# Patient Record
Sex: FEMALE | Race: WHITE | NOT HISPANIC OR LATINO | Employment: OTHER | URBAN - METROPOLITAN AREA
[De-identification: names, ages, dates, MRNs, and addresses within clinical notes are randomized per-mention and may not be internally consistent; named-entity substitution may affect disease eponyms.]

---

## 2019-04-23 ENCOUNTER — HOSPITAL ENCOUNTER (INPATIENT)
Facility: HOSPITAL | Age: 79
LOS: 1 days | Discharge: HOME | DRG: 305 | End: 2019-04-25
Attending: EMERGENCY MEDICINE | Admitting: EMERGENCY MEDICINE
Payer: MEDICARE

## 2019-04-23 DIAGNOSIS — R11.10 VOMITING, INTRACTABILITY OF VOMITING NOT SPECIFIED, PRESENCE OF NAUSEA NOT SPECIFIED, UNSPECIFIED VOMITING TYPE: ICD-10-CM

## 2019-04-23 DIAGNOSIS — K52.9 ENTERITIS: ICD-10-CM

## 2019-04-23 DIAGNOSIS — I10 ESSENTIAL HYPERTENSION: ICD-10-CM

## 2019-04-23 DIAGNOSIS — E11.8 TYPE 2 DIABETES MELLITUS WITH COMPLICATION, UNSPECIFIED WHETHER LONG TERM INSULIN USE: ICD-10-CM

## 2019-04-23 DIAGNOSIS — R51.9 ACUTE NONINTRACTABLE HEADACHE, UNSPECIFIED HEADACHE TYPE: ICD-10-CM

## 2019-04-23 DIAGNOSIS — E78.2 MIXED HYPERLIPIDEMIA: ICD-10-CM

## 2019-04-23 DIAGNOSIS — E11.42 TYPE 2 DIABETES MELLITUS WITH DIABETIC POLYNEUROPATHY, WITHOUT LONG-TERM CURRENT USE OF INSULIN: Chronic | ICD-10-CM

## 2019-04-23 DIAGNOSIS — R11.2 NON-INTRACTABLE VOMITING WITH NAUSEA, UNSPECIFIED VOMITING TYPE: ICD-10-CM

## 2019-04-23 DIAGNOSIS — I10 HTN (HYPERTENSION): ICD-10-CM

## 2019-04-23 DIAGNOSIS — I10 HYPERTENSION: ICD-10-CM

## 2019-04-23 DIAGNOSIS — R42 DIZZINESS: ICD-10-CM

## 2019-04-23 DIAGNOSIS — I16.1 HYPERTENSIVE EMERGENCY: Primary | ICD-10-CM

## 2019-04-23 LAB
ALBUMIN SERPL BCP-MCNC: 3.5 G/DL (ref 3.5–5.2)
ALP SERPL-CCNC: 99 U/L (ref 55–135)
ALT SERPL W/O P-5'-P-CCNC: 9 U/L (ref 10–44)
ANION GAP SERPL CALC-SCNC: 8 MMOL/L (ref 8–16)
APTT BLDCRRT: 24.5 SEC (ref 21–32)
AST SERPL-CCNC: 16 U/L (ref 10–40)
BASOPHILS # BLD AUTO: 0.03 K/UL (ref 0–0.2)
BASOPHILS NFR BLD: 0.5 % (ref 0–1.9)
BILIRUB SERPL-MCNC: 0.4 MG/DL (ref 0.1–1)
BILIRUB UR QL STRIP: NEGATIVE
BUN SERPL-MCNC: 12 MG/DL (ref 8–23)
CALCIUM SERPL-MCNC: 8.7 MG/DL (ref 8.7–10.5)
CHLORIDE SERPL-SCNC: 104 MMOL/L (ref 95–110)
CLARITY UR REFRACT.AUTO: CLEAR
CO2 SERPL-SCNC: 26 MMOL/L (ref 23–29)
COLOR UR AUTO: NORMAL
CREAT SERPL-MCNC: 0.8 MG/DL (ref 0.5–1.4)
DIFFERENTIAL METHOD: NORMAL
EOSINOPHIL # BLD AUTO: 0.1 K/UL (ref 0–0.5)
EOSINOPHIL NFR BLD: 2.2 % (ref 0–8)
ERYTHROCYTE [DISTWIDTH] IN BLOOD BY AUTOMATED COUNT: 13.3 % (ref 11.5–14.5)
EST. GFR  (AFRICAN AMERICAN): >60 ML/MIN/1.73 M^2
EST. GFR  (NON AFRICAN AMERICAN): >60 ML/MIN/1.73 M^2
GLUCOSE SERPL-MCNC: 174 MG/DL (ref 70–110)
GLUCOSE UR QL STRIP: NEGATIVE
HCT VFR BLD AUTO: 38.7 % (ref 37–48.5)
HGB BLD-MCNC: 12.6 G/DL (ref 12–16)
HGB UR QL STRIP: NEGATIVE
IMM GRANULOCYTES # BLD AUTO: 0.02 K/UL (ref 0–0.04)
IMM GRANULOCYTES NFR BLD AUTO: 0.3 % (ref 0–0.5)
INR PPP: 1 (ref 0.8–1.2)
KETONES UR QL STRIP: NEGATIVE
LEUKOCYTE ESTERASE UR QL STRIP: NEGATIVE
LYMPHOCYTES # BLD AUTO: 2.1 K/UL (ref 1–4.8)
LYMPHOCYTES NFR BLD: 35.1 % (ref 18–48)
MAGNESIUM SERPL-MCNC: 1.8 MG/DL (ref 1.6–2.6)
MCH RBC QN AUTO: 29.8 PG (ref 27–31)
MCHC RBC AUTO-ENTMCNC: 32.6 G/DL (ref 32–36)
MCV RBC AUTO: 92 FL (ref 82–98)
MONOCYTES # BLD AUTO: 0.5 K/UL (ref 0.3–1)
MONOCYTES NFR BLD: 8.8 % (ref 4–15)
NEUTROPHILS # BLD AUTO: 3.1 K/UL (ref 1.8–7.7)
NEUTROPHILS NFR BLD: 53.1 % (ref 38–73)
NITRITE UR QL STRIP: NEGATIVE
NRBC BLD-RTO: 0 /100 WBC
PH UR STRIP: 6 [PH] (ref 5–8)
PLATELET # BLD AUTO: 203 K/UL (ref 150–350)
PMV BLD AUTO: 9.5 FL (ref 9.2–12.9)
POTASSIUM SERPL-SCNC: 3.4 MMOL/L (ref 3.5–5.1)
PROT SERPL-MCNC: 6.5 G/DL (ref 6–8.4)
PROT UR QL STRIP: NEGATIVE
PROTHROMBIN TIME: 10.8 SEC (ref 9–12.5)
RBC # BLD AUTO: 4.23 M/UL (ref 4–5.4)
SODIUM SERPL-SCNC: 138 MMOL/L (ref 136–145)
SP GR UR STRIP: 1 (ref 1–1.03)
TROPONIN I SERPL DL<=0.01 NG/ML-MCNC: <0.006 NG/ML (ref 0–0.03)
URN SPEC COLLECT METH UR: NORMAL
WBC # BLD AUTO: 5.89 K/UL (ref 3.9–12.7)

## 2019-04-23 PROCEDURE — 99285 EMERGENCY DEPT VISIT HI MDM: CPT | Mod: 25

## 2019-04-23 PROCEDURE — 99285 EMERGENCY DEPT VISIT HI MDM: CPT | Mod: ,,, | Performed by: EMERGENCY MEDICINE

## 2019-04-23 PROCEDURE — 84484 ASSAY OF TROPONIN QUANT: CPT

## 2019-04-23 PROCEDURE — 81003 URINALYSIS AUTO W/O SCOPE: CPT

## 2019-04-23 PROCEDURE — 85610 PROTHROMBIN TIME: CPT

## 2019-04-23 PROCEDURE — 93010 ELECTROCARDIOGRAM REPORT: CPT | Mod: ,,, | Performed by: INTERNAL MEDICINE

## 2019-04-23 PROCEDURE — 99285 PR EMERGENCY DEPT VISIT,LEVEL V: ICD-10-PCS | Mod: ,,, | Performed by: EMERGENCY MEDICINE

## 2019-04-23 PROCEDURE — 85730 THROMBOPLASTIN TIME PARTIAL: CPT

## 2019-04-23 PROCEDURE — 93010 EKG 12-LEAD: ICD-10-PCS | Mod: ,,, | Performed by: INTERNAL MEDICINE

## 2019-04-23 PROCEDURE — 85025 COMPLETE CBC W/AUTO DIFF WBC: CPT

## 2019-04-23 PROCEDURE — 83735 ASSAY OF MAGNESIUM: CPT

## 2019-04-23 PROCEDURE — 80053 COMPREHEN METABOLIC PANEL: CPT

## 2019-04-23 PROCEDURE — 93005 ELECTROCARDIOGRAM TRACING: CPT

## 2019-04-23 RX ORDER — CITALOPRAM 40 MG/1
40 TABLET, FILM COATED ORAL DAILY
COMMUNITY

## 2019-04-23 RX ORDER — FUROSEMIDE 40 MG/1
40 TABLET ORAL 2 TIMES DAILY
Status: ON HOLD | COMMUNITY
End: 2019-04-25 | Stop reason: HOSPADM

## 2019-04-23 RX ORDER — GABAPENTIN 400 MG/1
400 CAPSULE ORAL DAILY
COMMUNITY

## 2019-04-23 RX ORDER — DONEPEZIL HYDROCHLORIDE 10 MG/1
10 TABLET, FILM COATED ORAL NIGHTLY
COMMUNITY

## 2019-04-23 RX ORDER — SIMVASTATIN 40 MG/1
40 TABLET, FILM COATED ORAL NIGHTLY
COMMUNITY

## 2019-04-23 RX ORDER — ALPRAZOLAM 0.25 MG/1
TABLET ORAL 3 TIMES DAILY
Status: ON HOLD | COMMUNITY
End: 2019-04-25 | Stop reason: HOSPADM

## 2019-04-23 RX ORDER — BENAZEPRIL HYDROCHLORIDE 40 MG/1
40 TABLET ORAL DAILY
Status: ON HOLD | COMMUNITY
End: 2019-04-25 | Stop reason: SDUPTHER

## 2019-04-24 PROBLEM — R11.2 NON-INTRACTABLE VOMITING WITH NAUSEA: Status: ACTIVE | Noted: 2019-04-24

## 2019-04-24 PROBLEM — I16.1 HYPERTENSIVE EMERGENCY: Status: ACTIVE | Noted: 2019-04-24

## 2019-04-24 LAB
ALBUMIN SERPL BCP-MCNC: 3.5 G/DL (ref 3.5–5.2)
ALP SERPL-CCNC: 99 U/L (ref 55–135)
ALT SERPL W/O P-5'-P-CCNC: 10 U/L (ref 10–44)
ANION GAP SERPL CALC-SCNC: 10 MMOL/L (ref 8–16)
AST SERPL-CCNC: 18 U/L (ref 10–40)
BASOPHILS # BLD AUTO: 0.04 K/UL (ref 0–0.2)
BASOPHILS NFR BLD: 0.7 % (ref 0–1.9)
BILIRUB SERPL-MCNC: 0.5 MG/DL (ref 0.1–1)
BUN SERPL-MCNC: 10 MG/DL (ref 8–23)
CALCIUM SERPL-MCNC: 9.1 MG/DL (ref 8.7–10.5)
CHLORIDE SERPL-SCNC: 104 MMOL/L (ref 95–110)
CO2 SERPL-SCNC: 26 MMOL/L (ref 23–29)
CREAT SERPL-MCNC: 0.7 MG/DL (ref 0.5–1.4)
DIFFERENTIAL METHOD: NORMAL
EOSINOPHIL # BLD AUTO: 0.1 K/UL (ref 0–0.5)
EOSINOPHIL NFR BLD: 2.1 % (ref 0–8)
ERYTHROCYTE [DISTWIDTH] IN BLOOD BY AUTOMATED COUNT: 13.2 % (ref 11.5–14.5)
EST. GFR  (AFRICAN AMERICAN): >60 ML/MIN/1.73 M^2
EST. GFR  (NON AFRICAN AMERICAN): >60 ML/MIN/1.73 M^2
ESTIMATED AVG GLUCOSE: 131 MG/DL (ref 68–131)
GLUCOSE SERPL-MCNC: 107 MG/DL (ref 70–110)
GLUCOSE SERPL-MCNC: 125 MG/DL (ref 70–110)
HBA1C MFR BLD HPLC: 6.2 % (ref 4–5.6)
HCT VFR BLD AUTO: 39.2 % (ref 37–48.5)
HGB BLD-MCNC: 12.7 G/DL (ref 12–16)
IMM GRANULOCYTES # BLD AUTO: 0.01 K/UL (ref 0–0.04)
IMM GRANULOCYTES NFR BLD AUTO: 0.2 % (ref 0–0.5)
LACTATE SERPL-SCNC: 1.7 MMOL/L (ref 0.5–2.2)
LIPASE SERPL-CCNC: 15 U/L (ref 4–60)
LYMPHOCYTES # BLD AUTO: 2.2 K/UL (ref 1–4.8)
LYMPHOCYTES NFR BLD: 36.3 % (ref 18–48)
MAGNESIUM SERPL-MCNC: 2.1 MG/DL (ref 1.6–2.6)
MCH RBC QN AUTO: 29.5 PG (ref 27–31)
MCHC RBC AUTO-ENTMCNC: 32.4 G/DL (ref 32–36)
MCV RBC AUTO: 91 FL (ref 82–98)
MONOCYTES # BLD AUTO: 0.5 K/UL (ref 0.3–1)
MONOCYTES NFR BLD: 7.7 % (ref 4–15)
NEUTROPHILS # BLD AUTO: 3.2 K/UL (ref 1.8–7.7)
NEUTROPHILS NFR BLD: 53 % (ref 38–73)
NRBC BLD-RTO: 0 /100 WBC
PHOSPHATE SERPL-MCNC: 4.3 MG/DL (ref 2.7–4.5)
PLATELET # BLD AUTO: 198 K/UL (ref 150–350)
PMV BLD AUTO: 9.7 FL (ref 9.2–12.9)
POCT GLUCOSE: 117 MG/DL (ref 70–110)
POCT GLUCOSE: 126 MG/DL (ref 70–110)
POCT GLUCOSE: 159 MG/DL (ref 70–110)
POTASSIUM SERPL-SCNC: 3.7 MMOL/L (ref 3.5–5.1)
PROT SERPL-MCNC: 6.5 G/DL (ref 6–8.4)
RBC # BLD AUTO: 4.31 M/UL (ref 4–5.4)
SODIUM SERPL-SCNC: 140 MMOL/L (ref 136–145)
TROPONIN I SERPL DL<=0.01 NG/ML-MCNC: <0.006 NG/ML (ref 0–0.03)
TSH SERPL DL<=0.005 MIU/L-ACNC: 2.13 UIU/ML (ref 0.4–4)
WBC # BLD AUTO: 6.08 K/UL (ref 3.9–12.7)

## 2019-04-24 PROCEDURE — 63600175 PHARM REV CODE 636 W HCPCS: Performed by: HOSPITALIST

## 2019-04-24 PROCEDURE — 80053 COMPREHEN METABOLIC PANEL: CPT

## 2019-04-24 PROCEDURE — 63600175 PHARM REV CODE 636 W HCPCS: Performed by: PHYSICIAN ASSISTANT

## 2019-04-24 PROCEDURE — 99233 SBSQ HOSP IP/OBS HIGH 50: CPT | Mod: GC,,, | Performed by: HOSPITALIST

## 2019-04-24 PROCEDURE — 96374 THER/PROPH/DIAG INJ IV PUSH: CPT

## 2019-04-24 PROCEDURE — 99233 PR SUBSEQUENT HOSPITAL CARE,LEVL III: ICD-10-PCS | Mod: GC,,, | Performed by: HOSPITALIST

## 2019-04-24 PROCEDURE — 25000003 PHARM REV CODE 250: Performed by: PHYSICIAN ASSISTANT

## 2019-04-24 PROCEDURE — 85025 COMPLETE CBC W/AUTO DIFF WBC: CPT

## 2019-04-24 PROCEDURE — 25000003 PHARM REV CODE 250: Performed by: HOSPITALIST

## 2019-04-24 PROCEDURE — 83036 HEMOGLOBIN GLYCOSYLATED A1C: CPT

## 2019-04-24 PROCEDURE — 99223 1ST HOSP IP/OBS HIGH 75: CPT | Mod: ,,, | Performed by: PHYSICIAN ASSISTANT

## 2019-04-24 PROCEDURE — 84484 ASSAY OF TROPONIN QUANT: CPT

## 2019-04-24 PROCEDURE — 99223 PR INITIAL HOSPITAL CARE,LEVL III: ICD-10-PCS | Mod: ,,, | Performed by: PHYSICIAN ASSISTANT

## 2019-04-24 PROCEDURE — 83605 ASSAY OF LACTIC ACID: CPT

## 2019-04-24 PROCEDURE — 84443 ASSAY THYROID STIM HORMONE: CPT

## 2019-04-24 PROCEDURE — 83690 ASSAY OF LIPASE: CPT

## 2019-04-24 PROCEDURE — 84100 ASSAY OF PHOSPHORUS: CPT

## 2019-04-24 PROCEDURE — 83735 ASSAY OF MAGNESIUM: CPT

## 2019-04-24 PROCEDURE — 36415 COLL VENOUS BLD VENIPUNCTURE: CPT

## 2019-04-24 PROCEDURE — 11000001 HC ACUTE MED/SURG PRIVATE ROOM

## 2019-04-24 RX ORDER — FUROSEMIDE 40 MG/1
40 TABLET ORAL 2 TIMES DAILY
Status: DISCONTINUED | OUTPATIENT
Start: 2019-04-24 | End: 2019-04-24

## 2019-04-24 RX ORDER — SIMVASTATIN 40 MG/1
40 TABLET, FILM COATED ORAL NIGHTLY
Status: DISCONTINUED | OUTPATIENT
Start: 2019-04-24 | End: 2019-04-25 | Stop reason: HOSPADM

## 2019-04-24 RX ORDER — SODIUM CHLORIDE 0.9 % (FLUSH) 0.9 %
10 SYRINGE (ML) INJECTION
Status: DISCONTINUED | OUTPATIENT
Start: 2019-04-24 | End: 2019-04-25 | Stop reason: HOSPADM

## 2019-04-24 RX ORDER — HYDROCHLOROTHIAZIDE 12.5 MG/1
12.5 TABLET ORAL DAILY
Status: DISCONTINUED | OUTPATIENT
Start: 2019-04-24 | End: 2019-04-25

## 2019-04-24 RX ORDER — GLUCAGON 1 MG
1 KIT INJECTION
Status: DISCONTINUED | OUTPATIENT
Start: 2019-04-24 | End: 2019-04-25 | Stop reason: HOSPADM

## 2019-04-24 RX ORDER — GLUCAGON 1 MG
1 KIT INJECTION
Status: DISCONTINUED | OUTPATIENT
Start: 2019-04-24 | End: 2019-04-24

## 2019-04-24 RX ORDER — CITALOPRAM 20 MG/1
40 TABLET, FILM COATED ORAL DAILY
Status: DISCONTINUED | OUTPATIENT
Start: 2019-04-24 | End: 2019-04-25 | Stop reason: HOSPADM

## 2019-04-24 RX ORDER — HYDRALAZINE HYDROCHLORIDE 20 MG/ML
10 INJECTION INTRAMUSCULAR; INTRAVENOUS ONCE
Status: DISCONTINUED | OUTPATIENT
Start: 2019-04-24 | End: 2019-04-24

## 2019-04-24 RX ORDER — CIPROFLOXACIN 500 MG/1
500 TABLET ORAL EVERY 12 HOURS
Status: DISCONTINUED | OUTPATIENT
Start: 2019-04-24 | End: 2019-04-25 | Stop reason: HOSPADM

## 2019-04-24 RX ORDER — IBUPROFEN 200 MG
16 TABLET ORAL
Status: DISCONTINUED | OUTPATIENT
Start: 2019-04-24 | End: 2019-04-24

## 2019-04-24 RX ORDER — AMLODIPINE BESYLATE 5 MG/1
5 TABLET ORAL DAILY
Status: DISCONTINUED | OUTPATIENT
Start: 2019-04-24 | End: 2019-04-25

## 2019-04-24 RX ORDER — LABETALOL HCL 20 MG/4 ML
10 SYRINGE (ML) INTRAVENOUS ONCE
Status: COMPLETED | OUTPATIENT
Start: 2019-04-24 | End: 2019-04-24

## 2019-04-24 RX ORDER — OMEPRAZOLE 40 MG/1
40 CAPSULE, DELAYED RELEASE ORAL DAILY
COMMUNITY

## 2019-04-24 RX ORDER — HYDRALAZINE HYDROCHLORIDE 20 MG/ML
10 INJECTION INTRAMUSCULAR; INTRAVENOUS
Status: COMPLETED | OUTPATIENT
Start: 2019-04-24 | End: 2019-04-24

## 2019-04-24 RX ORDER — MECLIZINE HYDROCHLORIDE 25 MG/1
25 TABLET ORAL 3 TIMES DAILY PRN
Status: DISCONTINUED | OUTPATIENT
Start: 2019-04-24 | End: 2019-04-25 | Stop reason: HOSPADM

## 2019-04-24 RX ORDER — ENOXAPARIN SODIUM 100 MG/ML
40 INJECTION SUBCUTANEOUS EVERY 24 HOURS
Status: DISCONTINUED | OUTPATIENT
Start: 2019-04-24 | End: 2019-04-25 | Stop reason: HOSPADM

## 2019-04-24 RX ORDER — AMOXICILLIN 250 MG
1 CAPSULE ORAL 2 TIMES DAILY
Status: DISCONTINUED | OUTPATIENT
Start: 2019-04-24 | End: 2019-04-25 | Stop reason: HOSPADM

## 2019-04-24 RX ORDER — BENAZEPRIL HYDROCHLORIDE 40 MG/1
40 TABLET ORAL DAILY
Status: DISCONTINUED | OUTPATIENT
Start: 2019-04-24 | End: 2019-04-25 | Stop reason: HOSPADM

## 2019-04-24 RX ORDER — METRONIDAZOLE 500 MG/1
500 TABLET ORAL EVERY 8 HOURS
Status: DISCONTINUED | OUTPATIENT
Start: 2019-04-24 | End: 2019-04-25 | Stop reason: HOSPADM

## 2019-04-24 RX ORDER — IBUPROFEN 200 MG
24 TABLET ORAL
Status: DISCONTINUED | OUTPATIENT
Start: 2019-04-24 | End: 2019-04-25 | Stop reason: HOSPADM

## 2019-04-24 RX ORDER — DONEPEZIL HYDROCHLORIDE 10 MG/1
10 TABLET, FILM COATED ORAL NIGHTLY
Status: DISCONTINUED | OUTPATIENT
Start: 2019-04-24 | End: 2019-04-25 | Stop reason: HOSPADM

## 2019-04-24 RX ORDER — IBUPROFEN 200 MG
24 TABLET ORAL
Status: DISCONTINUED | OUTPATIENT
Start: 2019-04-24 | End: 2019-04-24

## 2019-04-24 RX ORDER — IPRATROPIUM BROMIDE AND ALBUTEROL SULFATE 2.5; .5 MG/3ML; MG/3ML
3 SOLUTION RESPIRATORY (INHALATION) EVERY 4 HOURS PRN
Status: DISCONTINUED | OUTPATIENT
Start: 2019-04-24 | End: 2019-04-25 | Stop reason: HOSPADM

## 2019-04-24 RX ORDER — PROMETHAZINE HYDROCHLORIDE 25 MG/1
25 TABLET ORAL EVERY 6 HOURS PRN
Status: DISCONTINUED | OUTPATIENT
Start: 2019-04-24 | End: 2019-04-25 | Stop reason: HOSPADM

## 2019-04-24 RX ORDER — SODIUM CHLORIDE 0.9 % (FLUSH) 0.9 %
5 SYRINGE (ML) INJECTION
Status: DISCONTINUED | OUTPATIENT
Start: 2019-04-24 | End: 2019-04-25 | Stop reason: HOSPADM

## 2019-04-24 RX ORDER — ONDANSETRON 8 MG/1
8 TABLET, ORALLY DISINTEGRATING ORAL EVERY 8 HOURS PRN
Status: DISCONTINUED | OUTPATIENT
Start: 2019-04-24 | End: 2019-04-25 | Stop reason: HOSPADM

## 2019-04-24 RX ORDER — HYDRALAZINE HYDROCHLORIDE 50 MG/1
50 TABLET, FILM COATED ORAL EVERY 6 HOURS PRN
Status: DISCONTINUED | OUTPATIENT
Start: 2019-04-24 | End: 2019-04-25 | Stop reason: HOSPADM

## 2019-04-24 RX ORDER — RAMELTEON 8 MG/1
8 TABLET ORAL NIGHTLY PRN
Status: DISCONTINUED | OUTPATIENT
Start: 2019-04-24 | End: 2019-04-25 | Stop reason: HOSPADM

## 2019-04-24 RX ORDER — ACETAMINOPHEN 325 MG/1
650 TABLET ORAL EVERY 4 HOURS PRN
Status: DISCONTINUED | OUTPATIENT
Start: 2019-04-24 | End: 2019-04-25 | Stop reason: HOSPADM

## 2019-04-24 RX ORDER — PANTOPRAZOLE SODIUM 40 MG/1
40 TABLET, DELAYED RELEASE ORAL DAILY
Status: DISCONTINUED | OUTPATIENT
Start: 2019-04-24 | End: 2019-04-25 | Stop reason: HOSPADM

## 2019-04-24 RX ORDER — IBUPROFEN 200 MG
16 TABLET ORAL
Status: DISCONTINUED | OUTPATIENT
Start: 2019-04-24 | End: 2019-04-25 | Stop reason: HOSPADM

## 2019-04-24 RX ORDER — ACETAMINOPHEN 500 MG
1000 TABLET ORAL
Status: COMPLETED | OUTPATIENT
Start: 2019-04-24 | End: 2019-04-24

## 2019-04-24 RX ORDER — GABAPENTIN 400 MG/1
400 CAPSULE ORAL DAILY
Status: DISCONTINUED | OUTPATIENT
Start: 2019-04-24 | End: 2019-04-25 | Stop reason: HOSPADM

## 2019-04-24 RX ADMIN — ACETAMINOPHEN 1000 MG: 500 TABLET ORAL at 02:04

## 2019-04-24 RX ADMIN — DONEPEZIL HYDROCHLORIDE 10 MG: 10 TABLET ORAL at 10:04

## 2019-04-24 RX ADMIN — HYDROCHLOROTHIAZIDE 12.5 MG: 12.5 TABLET ORAL at 12:04

## 2019-04-24 RX ADMIN — ACETAMINOPHEN 650 MG: 325 TABLET ORAL at 05:04

## 2019-04-24 RX ADMIN — METRONIDAZOLE 500 MG: 500 TABLET ORAL at 02:04

## 2019-04-24 RX ADMIN — CIPROFLOXACIN HYDROCHLORIDE 500 MG: 500 TABLET, FILM COATED ORAL at 10:04

## 2019-04-24 RX ADMIN — ACETAMINOPHEN 650 MG: 325 TABLET ORAL at 10:04

## 2019-04-24 RX ADMIN — STANDARDIZED SENNA CONCENTRATE AND DOCUSATE SODIUM 1 TABLET: 8.6; 5 TABLET, FILM COATED ORAL at 08:04

## 2019-04-24 RX ADMIN — ACETAMINOPHEN 650 MG: 325 TABLET ORAL at 08:04

## 2019-04-24 RX ADMIN — GABAPENTIN 400 MG: 400 CAPSULE ORAL at 08:04

## 2019-04-24 RX ADMIN — FUROSEMIDE 40 MG: 40 TABLET ORAL at 08:04

## 2019-04-24 RX ADMIN — ALPRAZOLAM 0.12 MG: 0.25 TABLET ORAL at 10:04

## 2019-04-24 RX ADMIN — STANDARDIZED SENNA CONCENTRATE AND DOCUSATE SODIUM 1 TABLET: 8.6; 5 TABLET, FILM COATED ORAL at 10:04

## 2019-04-24 RX ADMIN — SIMVASTATIN 40 MG: 40 TABLET, FILM COATED ORAL at 10:04

## 2019-04-24 RX ADMIN — BENAZEPRIL HYDROCHLORIDE 40 MG: 40 TABLET, COATED ORAL at 08:04

## 2019-04-24 RX ADMIN — CITALOPRAM HYDROBROMIDE 40 MG: 20 TABLET ORAL at 08:04

## 2019-04-24 RX ADMIN — PANTOPRAZOLE SODIUM 40 MG: 40 TABLET, DELAYED RELEASE ORAL at 08:04

## 2019-04-24 RX ADMIN — PROMETHAZINE HYDROCHLORIDE 25 MG: 25 TABLET ORAL at 10:04

## 2019-04-24 RX ADMIN — LABETALOL HYDROCHLORIDE 10 MG: 5 INJECTION, SOLUTION INTRAVENOUS at 11:04

## 2019-04-24 RX ADMIN — METRONIDAZOLE 500 MG: 500 TABLET ORAL at 10:04

## 2019-04-24 RX ADMIN — AMLODIPINE BESYLATE 5 MG: 5 TABLET ORAL at 11:04

## 2019-04-24 RX ADMIN — ENOXAPARIN SODIUM 40 MG: 100 INJECTION SUBCUTANEOUS at 05:04

## 2019-04-24 RX ADMIN — HYDRALAZINE HYDROCHLORIDE 10 MG: 20 INJECTION INTRAMUSCULAR; INTRAVENOUS at 03:04

## 2019-04-24 NOTE — PROGRESS NOTES
"Hospital Medicine   Progress note      Team: Networked reference to record PCT  Jacquelyn SHAILA Martinez MD   Admit Date: 4/23/2019   Hospital Day: 0  LEE: 4/25/2019   Code status: Full Code   Principal Problem: Hypertensive emergency     Per HPI: Patient is a 78 year old lady with a h/o HTN, DM II not on insulin, HLD.  She presents with elevated BP.  No family at bedside.  Patient began having "gray spots" in her vision starting around 16:30.  She also noticed intermittent slurred speech and right-sided temporal headache.  Symptoms lasted about 45 minutes.  She took her BP at the time and noted that it was 214/114.  She states she has had dizziness and difficulty ambulating for the past few weeks.  She also reports noticing a "droop" to the left side of her face.  Denies unilateral weakness, numbness, paresthesias.  She does note two episodes of emesis today and chronic right sided abdominal pain.  Denies chest pain, SOB, palpitations, fever/chills.  Patient is noted to be somnolent on exam, but easily aroused.  She lives in [place] and is visiting for [facility].    Interval hx:   Pt was seen and examined at bedside. Pt had no acute events overnight, and no new complaints this morning. Pt's BP improved overnight but increased again this morning. BP noted to be 211/100. Pt complaining of a headache and mild chest tightness. Pt has been tolerating PO intake well but has some nausea but denies any vomiting, diarrhea, constipation, blood in stools or trouble urinating. Pt denies any fevers, chills, malaise, SOB, cough.     ROS (Positive in Bold, otherwise negative)  Constitutional: fever, chills, night sweats  CV: chest pain, edema, palpitations  Resp: SOB, cough, sputum production  GI: changes in appetite, NVDC, pain, melena, hematochezia, GERD, hematemesis  : Dysuria, hematuria, urinary urgency, frequency  MSK: arthralgia/myalgia, joint swelling  Neuro/Psych: anxiety, depression, headache    PEx   Temp:  [97.6 °F (36.4 °C)] "   Pulse:  [69-83]   Resp:  [18]   BP: (160-220)/(79-98)   SpO2:  [93 %-97 %]      I & O (Last 24H):   No intake or output data in the 24 hours ending 04/24/19 1002    General:  female  in no acute distress. Nontoxic. Resting in bed. Cooperative.  HEENT: NCAT. PERRL. EOMI. Sclera Anicteric.  CVS: RRR. Normal S1 S2. No murmurs  Pulm: CTAB. Normal respiratory effort. No wheezes, rhonchi, or crackles.  Abdomen: Soft. Non-distended. mild tenderness to palpation in RLQ. No rebound or guarding. +BS.  Extremities: No edema. No cyanosis. Full ROM.  Neuro: Alert, oriented x 4, Spont mvt of all extremities with no focal deficits noted.    Recent Results (from the past 24 hour(s))   CBC auto differential    Collection Time: 04/23/19  9:58 PM   Result Value Ref Range    WBC 5.89 3.90 - 12.70 K/uL    RBC 4.23 4.00 - 5.40 M/uL    Hemoglobin 12.6 12.0 - 16.0 g/dL    Hematocrit 38.7 37.0 - 48.5 %    MCV 92 82 - 98 fL    MCH 29.8 27.0 - 31.0 pg    MCHC 32.6 32.0 - 36.0 g/dL    RDW 13.3 11.5 - 14.5 %    Platelets 203 150 - 350 K/uL    MPV 9.5 9.2 - 12.9 fL    Immature Granulocytes 0.3 0.0 - 0.5 %    Gran # (ANC) 3.1 1.8 - 7.7 K/uL    Immature Grans (Abs) 0.02 0.00 - 0.04 K/uL    Lymph # 2.1 1.0 - 4.8 K/uL    Mono # 0.5 0.3 - 1.0 K/uL    Eos # 0.1 0.0 - 0.5 K/uL    Baso # 0.03 0.00 - 0.20 K/uL    nRBC 0 0 /100 WBC    Gran% 53.1 38.0 - 73.0 %    Lymph% 35.1 18.0 - 48.0 %    Mono% 8.8 4.0 - 15.0 %    Eosinophil% 2.2 0.0 - 8.0 %    Basophil% 0.5 0.0 - 1.9 %    Differential Method Automated    Comprehensive metabolic panel    Collection Time: 04/23/19  9:58 PM   Result Value Ref Range    Sodium 138 136 - 145 mmol/L    Potassium 3.4 (L) 3.5 - 5.1 mmol/L    Chloride 104 95 - 110 mmol/L    CO2 26 23 - 29 mmol/L    Glucose 174 (H) 70 - 110 mg/dL    BUN, Bld 12 8 - 23 mg/dL    Creatinine 0.8 0.5 - 1.4 mg/dL    Calcium 8.7 8.7 - 10.5 mg/dL    Total Protein 6.5 6.0 - 8.4 g/dL    Albumin 3.5 3.5 - 5.2 g/dL    Total Bilirubin 0.4 0.1 - 1.0  mg/dL    Alkaline Phosphatase 99 55 - 135 U/L    AST 16 10 - 40 U/L    ALT 9 (L) 10 - 44 U/L    Anion Gap 8 8 - 16 mmol/L    eGFR if African American >60.0 >60 mL/min/1.73 m^2    eGFR if non African American >60.0 >60 mL/min/1.73 m^2   Troponin I    Collection Time: 04/23/19  9:58 PM   Result Value Ref Range    Troponin I <0.006 0.000 - 0.026 ng/mL   Magnesium    Collection Time: 04/23/19  9:58 PM   Result Value Ref Range    Magnesium 1.8 1.6 - 2.6 mg/dL   Protime-INR    Collection Time: 04/23/19  9:58 PM   Result Value Ref Range    Prothrombin Time 10.8 9.0 - 12.5 sec    INR 1.0 0.8 - 1.2   APTT    Collection Time: 04/23/19  9:58 PM   Result Value Ref Range    aPTT 24.5 21.0 - 32.0 sec   Urinalysis, Reflex to Urine Culture Urine, Clean Catch    Collection Time: 04/23/19 10:31 PM   Result Value Ref Range    Specimen UA Urine, Clean Catch     Color, UA Straw Yellow, Straw, Ruthie    Appearance, UA Clear Clear    pH, UA 6.0 5.0 - 8.0    Specific Gravity, UA 1.005 1.005 - 1.030    Protein, UA Negative Negative    Glucose, UA Negative Negative    Ketones, UA Negative Negative    Bilirubin (UA) Negative Negative    Occult Blood UA Negative Negative    Nitrite, UA Negative Negative    Leukocytes, UA Negative Negative   Comprehensive Metabolic Panel (CMP)    Collection Time: 04/24/19  5:48 AM   Result Value Ref Range    Sodium 140 136 - 145 mmol/L    Potassium 3.7 3.5 - 5.1 mmol/L    Chloride 104 95 - 110 mmol/L    CO2 26 23 - 29 mmol/L    Glucose 107 70 - 110 mg/dL    BUN, Bld 10 8 - 23 mg/dL    Creatinine 0.7 0.5 - 1.4 mg/dL    Calcium 9.1 8.7 - 10.5 mg/dL    Total Protein 6.5 6.0 - 8.4 g/dL    Albumin 3.5 3.5 - 5.2 g/dL    Total Bilirubin 0.5 0.1 - 1.0 mg/dL    Alkaline Phosphatase 99 55 - 135 U/L    AST 18 10 - 40 U/L    ALT 10 10 - 44 U/L    Anion Gap 10 8 - 16 mmol/L    eGFR if African American >60.0 >60 mL/min/1.73 m^2    eGFR if non African American >60.0 >60 mL/min/1.73 m^2   Magnesium    Collection Time: 04/24/19   5:48 AM   Result Value Ref Range    Magnesium 2.1 1.6 - 2.6 mg/dL   Phosphorus    Collection Time: 04/24/19  5:48 AM   Result Value Ref Range    Phosphorus 4.3 2.7 - 4.5 mg/dL   Hemoglobin A1c    Collection Time: 04/24/19  5:48 AM   Result Value Ref Range    Hemoglobin A1C 6.2 (H) 4.0 - 5.6 %    Estimated Avg Glucose 131 68 - 131 mg/dL   CBC with Automated Differential    Collection Time: 04/24/19  5:48 AM   Result Value Ref Range    WBC 6.08 3.90 - 12.70 K/uL    RBC 4.31 4.00 - 5.40 M/uL    Hemoglobin 12.7 12.0 - 16.0 g/dL    Hematocrit 39.2 37.0 - 48.5 %    MCV 91 82 - 98 fL    MCH 29.5 27.0 - 31.0 pg    MCHC 32.4 32.0 - 36.0 g/dL    RDW 13.2 11.5 - 14.5 %    Platelets 198 150 - 350 K/uL    MPV 9.7 9.2 - 12.9 fL    Immature Granulocytes 0.2 0.0 - 0.5 %    Gran # (ANC) 3.2 1.8 - 7.7 K/uL    Immature Grans (Abs) 0.01 0.00 - 0.04 K/uL    Lymph # 2.2 1.0 - 4.8 K/uL    Mono # 0.5 0.3 - 1.0 K/uL    Eos # 0.1 0.0 - 0.5 K/uL    Baso # 0.04 0.00 - 0.20 K/uL    nRBC 0 0 /100 WBC    Gran% 53.0 38.0 - 73.0 %    Lymph% 36.3 18.0 - 48.0 %    Mono% 7.7 4.0 - 15.0 %    Eosinophil% 2.1 0.0 - 8.0 %    Basophil% 0.7 0.0 - 1.9 %    Differential Method Automated    POCT Glucose, Hand-Held Device    Collection Time: 04/24/19  6:28 AM   Result Value Ref Range    POC Glucose 125 (A) 70 - 110 MG/DL   POCT glucose    Collection Time: 04/24/19  9:25 AM   Result Value Ref Range    POCT Glucose 117 (H) 70 - 110 mg/dL       Recent Labs   Lab 04/24/19  0925   POCTGLUCOSE 117*       Hemoglobin A1C   Date Value Ref Range Status   04/24/2019 6.2 (H) 4.0 - 5.6 % Final     Comment:     ADA Screening Guidelines:  5.7-6.4%  Consistent with prediabetes  >or=6.5%  Consistent with diabetes  High levels of fetal hemoglobin interfere with the HbA1C  assay. Heterozygous hemoglobin variants (HbS, HgC, etc)do  not significantly interfere with this assay.   However, presence of multiple variants may affect accuracy.     06/29/2004 5.9 4.5 - 6.2 % Final         Active Hospital Problems    Diagnosis  POA    *Hypertensive emergency [I16.1]  Yes    Non-intractable vomiting with nausea [R11.2]  Yes    Type 2 diabetes mellitus with diabetic polyneuropathy, without long-term current use of insulin [E11.42]  Yes     Chronic    HLD (hyperlipidemia) [E78.5]  Yes     Chronic      Resolved Hospital Problems   No resolved problems to display.          Assessment and Plan for problems addressed today:      ALPRAZolam  0.125 mg Oral TID    benazepril  40 mg Oral Daily    citalopram  40 mg Oral Daily    donepezil  10 mg Oral QHS    furosemide  40 mg Oral BID    gabapentin  400 mg Oral Daily    pantoprazole  40 mg Oral Daily    senna-docusate 8.6-50 mg  1 tablet Oral BID    simvastatin  40 mg Oral QHS     acetaminophen, albuterol-ipratropium, dextrose 50%, dextrose 50%, glucagon (human recombinant), glucose, glucose, INV hydrALAZINE, meclizine, ondansetron, promethazine, ramelteon, sodium chloride 0.9%, sodium chloride 0.9%    Hypertensive emergency  -CT head, MRI/MRA brain and neck with no evidence of acute abnormalities. Results discussed by ED provider with vascular neurology, who felt TIA, CVA unlikely.  -UA unremarkable.  -BP in ED as high as 220/98.  Improved to 172/86 with IV hydralazine 10mg in the ED but this morning increased again to 211/100  -admit to monitored bed  -decrease MAP by 25% using IV agents  -Labetalol 10mg IV q1hrs available for SBP > 160  -Patient states her BP meds were changed December and HCTZ was discontinued.  -Continue home benazepril 40mg daily.  -restart PTA HCTZ 12.5mg BID  -start amlodipine 5mg daily  -Neuro checks q4hrs.  -check TSH, AM cortisol, renal arterial duplex  -check troponin     Acute encephalopathy secondary to acute delirium:  -Noted episodes of intermittent agitation and drowsiness.  -Continue correction of metabolic derangements  -continue donepezil   -Maintain Delirium precautions: Maintain regular sleep cycle. Early  ambulation. Minimal interruptions overnight. Re-orient patient frequently. Maintain adequate bowel regimen, hydration and electrolyte replenishments. Hearing Aids and eye glasses as needed.     Non-intractable Nausea with Vomiting:  Enteritis:  -Patient notes h/o chronic right-sided abdominal pain. With nausea today and had 7 episodes of vomiting prior to admission  -WBC wnl  -check lactic acid, lipase   -CT abdomen shows Mild distention and wall thickening of jejunal loops in the left upper quadrant without transition point to suggest definite obstruction.  Possible enteritis.   -zofran available PRN, PO phenergan as second choice   -abx with cipro/flagyl  -check stool studies  -monitor electrolytes, replete as necessary      HLD (hyperlipidemia)  -Continue simvastatin 40mg qHS.     Type 2 diabetes mellitus with diabetic polyneuropathy, without long-term current use of insulin  -Last HbA1c: 6.2 on 4/24/19  -SSI with accuchecks qACHS  -Continue gabapentin 400mg daily  -BG goal: Preprandial blood glucose target <140 mg/dL, Random glucoses <180 mg/dL  -ADA diet    Anxiety:  -continue PTA xanax 0.25mg TID  -continue PTA citalopram     DVT PPx: lovenox    Discharge plan and follow up: DC home once medically stable     Jacquelyn Martinez MD  Hospital Medicine Staff  [phone] pager

## 2019-04-24 NOTE — ASSESSMENT & PLAN NOTE
- CT head, MRI/MRA brain and neck with no evidence of acute abnormalities.  Results discussed by ED provider with vascular neurology, who felt TIA, CVA unlikely.  - UA unremarkable.  - BP in ED as high as 220/98.  Improved to 172/86 with IV hydralazine 10mg.  - Patient states her BP meds were changed December and HCTZ was discontinued.  - Continue home benazepril 40mg daily.  - PRN hydralazine.  - Neuro checks q4hrs.    DELIRIUM BEHAVIOR MANAGEMENT  - Minimize use of restraints; if physical restraints necessary, please utilize medical/chemical prns for agitation.  - Keep shades open and room lit during day and room dim at night in order to promote healthy circadian rhythms.  - Encourage family at bedside  - Keep whiteboard in patient's room current with the date and name of the members of patient's team for easy patient self re-orientation.  - Avoid benzodiazepines, antihistamines, anticholinergics, hypnotics, and minimize opiates while controlling for pain as these medications may exacerbate delirium.

## 2019-04-24 NOTE — ED PROVIDER NOTES
"Encounter Date: 4/23/2019       History     Chief Complaint   Patient presents with    Hypertension     took BP earlier with 214/114, also reports headache and dizziness x6 days, was seeing spots earlier, vomiting 4-5xs yesterday. Family reports slurred speech around 1630 today but has resolved. Family reports giving patient 0.25 xanax earlier     78 year old female with medical history of HTN, DM presenting to the ED with the chief complaint of hypertension. Patient reports developing vision changes earlier today at 4:30pm. She reports having several "gray spots" in her visual field in both eyes. She reports associated slurred speech at the time. She reports the symptoms improved after 45 minutes and denies having vision or speech changes at the time of my exam. She took her BP at the time with a wrist measuring device measuring 214/114. She reports having associated dizziness and ambulating difficulties for the past few weeks. She describes the dizziness as feeling lightheaded like she is going to pass-out. She uses a cane for ambulation assistance at times, but has not required one recently. She reports having 2 episodes of emesis yesterday. She reports noticing the left-side of her face "droop" at times. She reports having a right-sided temporal headache that is similar to her previous headaches. She denies thunder-clap onset, band-like distribution, or sinus congestion. No numbness, paresthesias, or unilateral extremity weakness. She denies recent falls or trauma. No blood thinner use. She reports compliancy with her blood pressure medications. Patient is from [place] and [place].         Review of patient's allergies indicates:   Allergen Reactions    Iodine and iodide containing products Anaphylaxis    Latex, natural rubber Dermatitis    Pcn [penicillins] Other (See Comments)     Swollen face, lips, turns red     Past Medical History:   Diagnosis Date    Diabetes " mellitus     HLD (hyperlipidemia)     Hypertension      History reviewed. No pertinent surgical history.  History reviewed. No pertinent family history.  Social History     Tobacco Use    Smoking status: Never Smoker    Smokeless tobacco: Never Used   Substance Use Topics    Alcohol use: Not Currently    Drug use: Never     Review of Systems   Constitutional: Negative for chills, diaphoresis and fever.   HENT: Negative for congestion, sore throat and trouble swallowing.    Eyes: Positive for visual disturbance (gray spots in VF bilaterally (resolved)). Negative for pain and redness.   Respiratory: Negative for cough and shortness of breath.    Cardiovascular: Negative for chest pain and leg swelling.   Gastrointestinal: Positive for vomiting. Negative for abdominal pain, diarrhea and nausea.   Genitourinary: Negative for dysuria and hematuria.   Musculoskeletal: Positive for gait problem. Negative for neck pain and neck stiffness.   Skin: Negative for rash and wound.   Neurological: Positive for dizziness, speech difficulty (slurred (resolved)), light-headedness and headaches.     Physical Exam     Initial Vitals [04/23/19 2025]   BP Pulse Resp Temp SpO2   (!) 178/81 80 18 97.6 °F (36.4 °C) 95 %      MAP       --         Physical Exam    Constitutional: She appears well-developed and well-nourished. She is not diaphoretic. No distress.   HENT:   Head: Normocephalic and atraumatic.   Mouth/Throat: Oropharynx is clear and moist. No oropharyngeal exudate.   Eyes: EOM are normal. Pupils are equal, round, and reactive to light.   Neck: Normal range of motion. Neck supple.   Cardiovascular: Normal rate, regular rhythm and intact distal pulses.   Pulmonary/Chest: Breath sounds normal. No respiratory distress.   Abdominal: Soft. She exhibits no distension. There is tenderness (Mild, RLQ).   Musculoskeletal: Normal range of motion. She exhibits no edema or tenderness.   Neurological: She is alert and oriented to  person, place, and time. She has normal strength. No sensory deficit.   Positive sway with Romberg  Follows commands appropriately. No dysmetria, dysdiadochokinesia, peripheral vision deficits. Negative pronator drift.   Skin: Skin is warm and dry.       ED Course   Procedures  Labs Reviewed   COMPREHENSIVE METABOLIC PANEL - Abnormal; Notable for the following components:       Result Value    Potassium 3.4 (*)     Glucose 174 (*)     ALT 9 (*)     All other components within normal limits   CBC W/ AUTO DIFFERENTIAL   TROPONIN I   MAGNESIUM   URINALYSIS, REFLEX TO URINE CULTURE    Narrative:     YELLOW AND GRAY   Preferred Collection Type->Urine, Clean Catch   PROTIME-INR   APTT        ECG Results          EKG 12-lead (In process)  Result time 04/23/19 22:38:13    In process by Interface, Lab In [facility] (04/23/19 22:38:13)                 Narrative:    Test Reason : I10,    Vent. Rate : 073 BPM     Atrial Rate : 073 BPM     P-R Int : 146 ms          QRS Dur : 078 ms      QT Int : 408 ms       P-R-T Axes : 069 041 045 degrees     QTc Int : 449 ms    Normal sinus rhythm  Normal ECG  When compared with ECG of 24-JUL-2002 14:24,  No significant change was found    Referred By: [email or web address]   SELF           Confirmed By:                             Imaging Results          CT Abdomen Pelvis  Without Contrast (In process)  Result time 04/24/19 02:18:32   Procedure changed from CT Abdomen Pelvis With Contrast                MRI Brain Without Contrast (Final result)  Result time 04/24/19 00:34:26    Final result by Enzo Portillo MD (04/24/19 00:34:26)                 Impression:      Limited study.  Blurring of images on some sequences from patient motion.    No acute abnormality.    Few supratentorial white matter T2/flair hyperintense signal foci suggesting sequela of mild chronic small vessel ischemic change.    Small remote lacunar infarct right cerebellum.    Right maxillary sinus mucosal  disease.        Electronically signed by: Enzo Portillo MD  Date:    04/24/2019  Time:    00:34             Narrative:    EXAMINATION:  MRI BRAIN WITHOUT CONTRAST    CLINICAL HISTORY:  Neuro deficit(s), subacute;.    TECHNIQUE:  Multiplanar multisequence MR imaging of the brain was performed without contrast.    COMPARISON:  None    FINDINGS:  Intracranial compartment:    Blurring of images on some sequences from patient motion.    Ventricles and sulci are normal in size for age without evidence of hydrocephalus. No extra-axial blood or fluid collections.    Few supratentorial white matter T2/flair hyperintense signal foci suggesting sequela of mild chronic small vessel ischemic change.  Small remote lacunar infarct right cerebellum.  No mass lesion, acute hemorrhage, edema or acute infarct.    Normal vascular flow voids are preserved.    Skull/extracranial contents (limited evaluation): Bone marrow signal intensity is normal.  Mucoperiosteal thickening right maxillary antrum.                               MRA Neck without contrast (Final result)  Result time 04/24/19 00:27:17    Final result by Enzo Portillo MD (04/24/19 00:27:17)                 Impression:      Examination limited by motion.    Cervical carotid and vertebral vessels appear patent.      Electronically signed by: Enzo Portillo MD  Date:    04/24/2019  Time:    00:27             Narrative:    EXAMINATION:  MRA NECK WITHOUT CONTRAST    CLINICAL HISTORY:  Dizziness;    TECHNIQUE:  Noncontrast 2D time-of-flight MR angiography of the neck was performed with MIP reformatting.    COMPARISON:  None    FINDINGS:  Examination limited by motion.    Aorta: Normal 3 vessel arch.    Extracranial carotid circulation: No hemodynamically significant stenosis, aneurysmal dilatation, or dissection identified.    Extracranial vertebral circulation: No hemodynamically significant stenosis, aneurysmal dilatation, or dissection identified.                                MRA Brain without contrast (Final result)  Result time 04/24/19 00:26:02    Final result by Enzo Portillo MD (04/24/19 00:26:02)                 Impression:      Examination limited by motion.  [id number] vasculature appears patent.      Electronically signed by: Enzo Portillo MD  Date:    04/24/2019  Time:    00:26             Narrative:    EXAMINATION:  MRA BRAIN WITHOUT CONTRAST    CLINICAL HISTORY:  Dizziness; .    TECHNIQUE:  Non-contrast 3-D time-of-flight intracranial MR angiography was performed through the [place] with MIP reformatting.    COMPARISON:  None.    CLINICAL HISTORY:  Examination limited by motion.    Anterior intracranial circulation: No high-grade focal stenosis, occlusion, aneurysm, or vascular malformation identified.    Posterior intracranial circulation: No high-grade focal stenosis, occlusion, aneurysm, or vascular malformation identified.                               CT Head Without Contrast (Final result)  Result time 04/23/19 22:29:09    Final result by Althea Casas MD (04/23/19 22:29:09)                 Impression:      1. No CT evidence of acute intracranial abnormality. Clinical correlation and further evaluation as warranted.  2. Mild generalized cerebral volume loss and microangiopathic change.  3. Essentially complete opacification of the right maxillary sinus. Hyperdensity intermixed with sinonasal opacity within the right maxillary sinus is concerning for inspissated secretions versus fungal colonization.      Electronically signed by: Althea Casas MD  Date:    04/23/2019  Time:    22:29             Narrative:    EXAMINATION:  CT HEAD WITHOUT CONTRAST    CLINICAL HISTORY:  Stroke;gray spots vision with slurred speech (resolved), vomiting last night;    TECHNIQUE:  Low dose axial images were obtained through the head.  Coronal and sagittal reformations were also performed. Contrast was not administered.    COMPARISON:  If there is clinical  concern for acute ischemia    FINDINGS:  There is no acute intracranial hemorrhage, hydrocephalus, midline shift or mass effect.  There is mild generalized cerebral volume loss.  Gray-white matter differentiation appears maintained with mild microangiopathic change.  The basal cisterns are patent. There is essentially complete opacification of the right maxillary sinus.  Hyperdensity intermixed with sinonasal opacity within the right maxillary sinus is concerning for inspissated secretions versus fungal colonization.  The visualized bones of the calvarium demonstrate no acute osseous abnormality.                               X-Ray Chest PA And Lateral (Final result)  Result time 04/23/19 22:25:25    Final result by Althea Casas MD (04/23/19 22:25:25)                 Impression:      No radiographic evidence of acute intra-thoracic process.      Electronically signed by: Althea Casas MD  Date:    04/23/2019  Time:    22:25             Narrative:    EXAMINATION:  XR CHEST PA AND LATERAL    CLINICAL HISTORY:  Essential (primary) hypertension    TECHNIQUE:  PA and lateral views of the chest were performed.    COMPARISON:  None    FINDINGS:  Cardiac monitoring leads overlie the chest.  The cardiomediastinal silhouette is within normal limits with atherosclerosis of the thoracic aorta.  The visualized airway is unremarkable.  The lungs appear symmetrically aerated without definite focal alveolar consolidation. No large pleural effusion or pneumothorax is appreciated.Visualized osseous structures are intact with degenerative change.                                       APC / Resident Notes:   78 year old female with medical history of HTN, DM presenting to the ED c/o hypertension. Pt reports having B/L gray spots in visual field today at 4:30pm that resolved after 45 minutes. Reports ambulation difficulties and dizziness for past few weeks. Headache and emesis x2 yesterday. DDx includes but not limited to hypertensive  "urgency, hypertensive emergency, TIA, CVA, intracranial hemorrhage, SDH, brain mass, intracranial hypertension, cerebral hypoperfusion, complex migraine, seizure d/o. Will get labs and CT head.     Work-up shows WBC 5.8, H/H 12.6/38.7, Plt 203, CMP unremarkable, Crt 0.8, Trop <0.006  ECG shows NSR 73 bpm. No STEMI    10:39 PM - Francisco Bernard PA-C  CT head without acute findings. Discussed patient with vascular neurology regarding concerns for TIA. Recommending MRI/MRA brain and neck for further evaluation. Will place official consult if significant findings on imaging.     2:45 AM - Francisco Bernard PA-C  MRI/MRA negative for acute findings. Small remote lacunar infarct in R cerebellum. Discussed imaging with vascular neurology and feel TIA and CVA less likely. CT abd/pelv obtained for further evaluation of emesis and reports of her abdomen "feeling distended" on re-evaluation. Patient on reassessment reports dizziness has persisted, but denies having vision changes or other neurological deficits during ED stay. Etiology concerning for hypertensive emergency. Do not feel patient stable for outpatient therapy at this time. Will give dose of Hydralazine IV for elevated BP and place in observation for ongoing management. Patient expresses understanding and agreeable to the plan. I have discussed the care of this patient with my supervising physician.                  Clinical Impression:       ICD-10-CM ICD-9-CM   1. Hypertensive emergency I16.1 401.9   2. Hypertension I10 401.9   3. Dizziness R42 780.4   4. Vomiting, intractability of vomiting not specified, presence of nausea not specified, unspecified vomiting type R11.10 787.03   5. Essential hypertension I10 401.9   6. Type 2 diabetes mellitus with complication, unspecified whether long term insulin use E11.8 250.90   7. Mixed hyperlipidemia E78.2 272.2         Disposition:   Disposition: Placed in Observation  Condition: Fair                        Francisco Bernard, " RADHA  04/24/19 1821

## 2019-04-24 NOTE — ED NOTES
Patient in bed resting comfortably. She remains on continuous cardiac monitoring, pulse oximetry, and blood pressure.  She is not currently in any distress, bed in lowest position, both side rails are up, and call light is in reach.  Will continue to monitor.

## 2019-04-24 NOTE — UM SECONDARY REVIEW
Physician Advisor External    Level of Care Issue    Denied IP or OBS - not appropriate- 04/24/2019 @ 0745- EHR determination:  INpatient appropriate per Dr. Gerald Garzon.

## 2019-04-24 NOTE — HPI
"Patient is a 78 year old lady with a h/o HTN, DM II not on insulin, HLD.  She presents with elevated BP.  No family at bedside.  Patient began having "gray spots" in her vision starting around 16:30.  She also noticed intermittent slurred speech and right-sided temporal headache.  Symptoms lasted about 45 minutes.  She took her BP at the time and noted that it was 214/114.  She states she has had dizziness and difficulty ambulating for the past few weeks.  She also reports noticing a "droop" to the left side of her face.  Denies unilateral weakness, numbness, paresthesias.  She does note two episodes of emesis today and chronic right sided abdominal pain.  Denies chest pain, SOB, palpitations, fever/chills.  Patient is noted to be somnolent on exam, but easily aroused.  She lives in [place] and is visiting for [place].  "

## 2019-04-24 NOTE — ASSESSMENT & PLAN NOTE
- Patient notes h/o chronic right-sided abdominal pain.  - CT abdomen/pelvis pending.  - Supportive care.

## 2019-04-24 NOTE — H&P
"[facility] Hospital Medicine  History & Physical    Patient Name: Anjana Reynolds  MRN: [id number]  Admission Date: 4/23/2019  Attending Physician: Mercy Quiros MD   Primary Care Provider: Fernie Houser RN    [facility] Medicine Team: [facility] T Maria Guadalupe Costello PA-C     Patient information was obtained from patient and ER records.     Subjective:     Principal Problem:Hypertensive emergency    Chief Complaint:   Chief Complaint   Patient presents with    Hypertension     took BP earlier with 214/114, also reports headache and dizziness x6 days, was seeing spots earlier, vomiting 4-5xs yesterday. Family reports slurred speech around 1630 today but has resolved. Family reports giving patient 0.25 xanax earlier        HPI: Patient is a 78 year old lady with a h/o HTN, DM II not on insulin, HLD.  She presents with elevated BP.  No family at bedside.  Patient began having "gray spots" in her vision starting around 16:30.  She also noticed intermittent slurred speech and right-sided temporal headache.  Symptoms lasted about 45 minutes.  She took her BP at the time and noted that it was 214/114.  She states she has had dizziness and difficulty ambulating for the past few weeks.  She also reports noticing a "droop" to the left side of her face.  Denies unilateral weakness, numbness, paresthesias.  She does note two episodes of emesis today and chronic right sided abdominal pain.  Denies chest pain, SOB, palpitations, fever/chills.  Patient is noted to be somnolent on exam, but easily aroused.  She lives in [place] and is visiting for [facility].    Past Medical History:   Diagnosis Date    Diabetes mellitus     HLD (hyperlipidemia)     Hypertension        History reviewed. No pertinent surgical history.    Review of patient's allergies indicates:   Allergen Reactions    Iodine and iodide containing products Anaphylaxis    Latex, natural rubber Dermatitis    Pcn [penicillins] Other (See Comments)    "  Swollen face, lips, turns red       No current facility-administered medications on file prior to encounter.      Current Outpatient Medications on File Prior to Encounter   Medication Sig    ALPRAZolam (XANAX) 0.25 MG tablet Take by mouth 3 (three) times daily.    benazepril (LOTENSIN) 40 MG tablet Take 40 mg by mouth once daily.    citalopram (CELEXA) 40 MG tablet Take 40 mg by mouth once daily.    donepezil (ARICEPT) 10 MG tablet Take 10 mg by mouth every evening.    furosemide (LASIX) 40 MG tablet Take 40 mg by mouth 2 (two) times daily.    gabapentin (NEURONTIN) 400 MG capsule Take 400 mg by mouth once daily.     omeprazole (PRILOSEC) 40 MG capsule Take 40 mg by mouth once daily.    simvastatin (ZOCOR) 40 MG tablet Take 40 mg by mouth every evening.     Family History     None        Tobacco Use    Smoking status: Never Smoker    Smokeless tobacco: Never Used   Substance and Sexual Activity    Alcohol use: Not Currently    Drug use: Never    Sexual activity: Not Currently     Review of Systems   Constitutional: Negative for activity change, appetite change, chills, fatigue, fever and unexpected weight change.   HENT: Negative for congestion, rhinorrhea, sore throat, trouble swallowing and voice change.    Eyes: Positive for visual disturbance.   Respiratory: Negative for cough, choking, chest tightness, shortness of breath and wheezing.    Cardiovascular: Negative for chest pain, palpitations and leg swelling.   Gastrointestinal: Positive for nausea and vomiting. Negative for abdominal distention, abdominal pain (Chronic), anal bleeding, blood in stool, constipation and diarrhea.   Endocrine: Negative for cold intolerance, heat intolerance, polydipsia and polyuria.   Genitourinary: Negative for dysuria, flank pain, frequency, hematuria and urgency.   Musculoskeletal: Positive for gait problem. Negative for arthralgias, back pain, joint swelling and myalgias.   Skin: Negative for color change and  rash.   Neurological: Positive for dizziness, facial asymmetry and speech difficulty. Negative for seizures, syncope, weakness, light-headedness, numbness and headaches.   Hematological: Negative for adenopathy. Does not bruise/bleed easily.   Psychiatric/Behavioral: Negative for agitation, confusion, hallucinations and suicidal ideas.     Objective:     Vital Signs (Most Recent):  Temp: 97.6 °F (36.4 °C) (04/23/19 2025)  Pulse: 83 (04/23/19 2112)  Resp: 18 (04/23/19 2025)  BP: (!) 172/86 (04/24/19 0355)  SpO2: 95 % (04/23/19 2112) Vital Signs (24h Range):  Temp:  [97.6 °F (36.4 °C)] 97.6 °F (36.4 °C)  Pulse:  [80-83] 83  Resp:  [18] 18  SpO2:  [95 %] 95 %  BP: (172-220)/(79-98) 172/86     Weight: 81.6 kg (180 lb)  Body mass index is 29.95 kg/m².    Physical Exam   Constitutional: She is oriented to person, place, and time. She appears well-developed and well-nourished. No distress.   HENT:   Head: Normocephalic and atraumatic.   Eyes: Pupils are equal, round, and reactive to light.   Neck: Neck supple. Carotid bruit is not present. No thyromegaly present.   Cardiovascular: Normal rate and regular rhythm. Exam reveals no gallop.   No murmur heard.  Pulmonary/Chest: Effort normal and breath sounds normal. No respiratory distress. She has no wheezes.   Abdominal: Soft. Bowel sounds are normal. She exhibits no distension and no mass. There is no splenomegaly or hepatomegaly. There is tenderness in the right lower quadrant.   Musculoskeletal: Normal range of motion. She exhibits no edema or deformity.   Neurological: She is alert and oriented to person, place, and time. She has normal strength. No cranial nerve deficit or sensory deficit. GCS eye subscore is 4. GCS verbal subscore is 5. GCS motor subscore is 6.   Skin: Skin is warm and dry. No rash noted.   Psychiatric: She has a normal mood and affect.         CRANIAL NERVES     CN III, IV, VI   Pupils are equal, round, and reactive to light.       Significant Labs:    CBC:   Recent Labs   Lab 04/23/19  2158   WBC 5.89   HGB 12.6   HCT 38.7        CMP:   Recent Labs   Lab 04/23/19  2158      K 3.4*      CO2 26   *   BUN 12   CREATININE 0.8   CALCIUM 8.7   PROT 6.5   ALBUMIN 3.5   BILITOT 0.4   ALKPHOS 99   AST 16   ALT 9*   ANIONGAP 8   EGFRNONAA >60.0     Urine Studies:   Recent Labs   Lab 04/23/19  2231   COLORU Straw   APPEARANCEUA Clear   PHUR 6.0   SPECGRAV 1.005   PROTEINUA Negative   GLUCUA Negative   KETONESU Negative   BILIRUBINUA Negative   OCCULTUA Negative   NITRITE Negative   LEUKOCYTESUR Negative       Significant Imaging: I have reviewed all pertinent imaging results/findings within the past 24 hours.    Assessment/Plan:     * Hypertensive emergency  - CT head, MRI/MRA brain and neck with no evidence of acute abnormalities.  Results discussed by ED provider with vascular neurology, who felt TIA, CVA unlikely.  - UA unremarkable.  - BP in ED as high as 220/98.  Improved to 172/86 with IV hydralazine 10mg.  - Patient states her BP meds were changed December and HCTZ was discontinued.  - Continue home benazepril 40mg daily.  - PRN hydralazine.  - Neuro checks q4hrs.    DELIRIUM BEHAVIOR MANAGEMENT  - Minimize use of restraints; if physical restraints necessary, please utilize medical/chemical prns for agitation.  - Keep shades open and room lit during day and room dim at night in order to promote healthy circadian rhythms.  - Encourage family at bedside  - Keep whiteboard in patient's room current with the date and name of the members of patient's team for easy patient self re-orientation.  - Avoid benzodiazepines, antihistamines, anticholinergics, hypnotics, and minimize opiates while controlling for pain as these medications may exacerbate delirium.        Non-intractable vomiting with nausea  - Patient notes h/o chronic right-sided abdominal pain.  - CT abdomen/pelvis pending.  - Supportive care.    HLD (hyperlipidemia)  - Continue  simvastatin 40mg qHS.    Type 2 diabetes mellitus with diabetic polyneuropathy, without long-term current use of insulin  - Patient diet controlled.  - Will check HgbA1c.  - Continue gabapentin 400mg daily.        VTE Risk Mitigation (From admission, onward)        Ordered     IP VTE HIGH RISK PATIENT  Once      04/24/19 0529     Place sequential compression device  Until discontinued      04/24/19 0529     Place ROCK hose  Until discontinued      04/24/19 0529             Maria Guadalupe Costello PA-C  Department of Hospital Medicine   [facility]

## 2019-04-24 NOTE — SUBJECTIVE & OBJECTIVE
Past Medical History:   Diagnosis Date    Diabetes mellitus     HLD (hyperlipidemia)     Hypertension        History reviewed. No pertinent surgical history.    Review of patient's allergies indicates:   Allergen Reactions    Iodine and iodide containing products Anaphylaxis    Latex, natural rubber Dermatitis    Pcn [penicillins] Other (See Comments)     Swollen face, lips, turns red       No current facility-administered medications on file prior to encounter.      Current Outpatient Medications on File Prior to Encounter   Medication Sig    ALPRAZolam (XANAX) 0.25 MG tablet Take by mouth 3 (three) times daily.    benazepril (LOTENSIN) 40 MG tablet Take 40 mg by mouth once daily.    citalopram (CELEXA) 40 MG tablet Take 40 mg by mouth once daily.    donepezil (ARICEPT) 10 MG tablet Take 10 mg by mouth every evening.    furosemide (LASIX) 40 MG tablet Take 40 mg by mouth 2 (two) times daily.    gabapentin (NEURONTIN) 400 MG capsule Take 400 mg by mouth once daily.     omeprazole (PRILOSEC) 40 MG capsule Take 40 mg by mouth once daily.    simvastatin (ZOCOR) 40 MG tablet Take 40 mg by mouth every evening.     Family History     None        Tobacco Use    Smoking status: Never Smoker    Smokeless tobacco: Never Used   Substance and Sexual Activity    Alcohol use: Not Currently    Drug use: Never    Sexual activity: Not Currently     Review of Systems   Constitutional: Negative for activity change, appetite change, chills, fatigue, fever and unexpected weight change.   HENT: Negative for congestion, rhinorrhea, sore throat, trouble swallowing and voice change.    Eyes: Positive for visual disturbance.   Respiratory: Negative for cough, choking, chest tightness, shortness of breath and wheezing.    Cardiovascular: Negative for chest pain, palpitations and leg swelling.   Gastrointestinal: Positive for nausea and vomiting. Negative for abdominal distention, abdominal pain (Chronic), anal bleeding,  blood in stool, constipation and diarrhea.   Endocrine: Negative for cold intolerance, heat intolerance, polydipsia and polyuria.   Genitourinary: Negative for dysuria, flank pain, frequency, hematuria and urgency.   Musculoskeletal: Positive for gait problem. Negative for arthralgias, back pain, joint swelling and myalgias.   Skin: Negative for color change and rash.   Neurological: Positive for dizziness, facial asymmetry and speech difficulty. Negative for seizures, syncope, weakness, light-headedness, numbness and headaches.   Hematological: Negative for adenopathy. Does not bruise/bleed easily.   Psychiatric/Behavioral: Negative for agitation, confusion, hallucinations and suicidal ideas.     Objective:     Vital Signs (Most Recent):  Temp: 97.6 °F (36.4 °C) (04/23/19 2025)  Pulse: 83 (04/23/19 2112)  Resp: 18 (04/23/19 2025)  BP: (!) 172/86 (04/24/19 0355)  SpO2: 95 % (04/23/19 2112) Vital Signs (24h Range):  Temp:  [97.6 °F (36.4 °C)] 97.6 °F (36.4 °C)  Pulse:  [80-83] 83  Resp:  [18] 18  SpO2:  [95 %] 95 %  BP: (172-220)/(79-98) 172/86     Weight: 81.6 kg (180 lb)  Body mass index is 29.95 kg/m².    Physical Exam   Constitutional: She is oriented to person, place, and time. She appears well-developed and well-nourished. No distress.   HENT:   Head: Normocephalic and atraumatic.   Eyes: Pupils are equal, round, and reactive to light.   Neck: Neck supple. Carotid bruit is not present. No thyromegaly present.   Cardiovascular: Normal rate and regular rhythm. Exam reveals no gallop.   No murmur heard.  Pulmonary/Chest: Effort normal and breath sounds normal. No respiratory distress. She has no wheezes.   Abdominal: Soft. Bowel sounds are normal. She exhibits no distension and no mass. There is no splenomegaly or hepatomegaly. There is tenderness in the right lower quadrant.   Musculoskeletal: Normal range of motion. She exhibits no edema or deformity.   Neurological: She is alert and oriented to person, place,  and time. She has normal strength. No cranial nerve deficit or sensory deficit. GCS eye subscore is 4. GCS verbal subscore is 5. GCS motor subscore is 6.   Skin: Skin is warm and dry. No rash noted.   Psychiatric: She has a normal mood and affect.         CRANIAL NERVES     CN III, IV, VI   Pupils are equal, round, and reactive to light.       Significant Labs:   CBC:   Recent Labs   Lab 04/23/19 2158   WBC 5.89   HGB 12.6   HCT 38.7        CMP:   Recent Labs   Lab 04/23/19 2158      K 3.4*      CO2 26   *   BUN 12   CREATININE 0.8   CALCIUM 8.7   PROT 6.5   ALBUMIN 3.5   BILITOT 0.4   ALKPHOS 99   AST 16   ALT 9*   ANIONGAP 8   EGFRNONAA >60.0     Urine Studies:   Recent Labs   Lab 04/23/19  2231   COLORU Straw   APPEARANCEUA Clear   PHUR 6.0   SPECGRAV 1.005   PROTEINUA Negative   GLUCUA Negative   KETONESU Negative   BILIRUBINUA Negative   OCCULTUA Negative   NITRITE Negative   LEUKOCYTESUR Negative       Significant Imaging: I have reviewed all pertinent imaging results/findings within the past 24 hours.

## 2019-04-25 ENCOUNTER — CLINICAL SUPPORT (OUTPATIENT)
Dept: CARDIOLOGY | Facility: CLINIC | Age: 79
DRG: 305 | End: 2019-04-25
Payer: MEDICARE

## 2019-04-25 VITALS
OXYGEN SATURATION: 90 % | SYSTOLIC BLOOD PRESSURE: 130 MMHG | DIASTOLIC BLOOD PRESSURE: 60 MMHG | RESPIRATION RATE: 18 BRPM | BODY MASS INDEX: 29.99 KG/M2 | TEMPERATURE: 99 F | HEIGHT: 65 IN | HEART RATE: 79 BPM | WEIGHT: 180 LBS

## 2019-04-25 PROBLEM — K52.9 ENTERITIS: Status: ACTIVE | Noted: 2019-04-25

## 2019-04-25 PROBLEM — R51.9 HEADACHE: Status: ACTIVE | Noted: 2019-04-25

## 2019-04-25 LAB
ABDOMINAL AORTA MID EDV: 0 CM/S
ABDOMINAL AORTA MID PSV: 148 CM/S
ALBUMIN SERPL BCP-MCNC: 3.3 G/DL (ref 3.5–5.2)
ALP SERPL-CCNC: 94 U/L (ref 55–135)
ALT SERPL W/O P-5'-P-CCNC: 10 U/L (ref 10–44)
ANION GAP SERPL CALC-SCNC: 9 MMOL/L (ref 8–16)
AST SERPL-CCNC: 12 U/L (ref 10–40)
BASOPHILS # BLD AUTO: 0.03 K/UL (ref 0–0.2)
BASOPHILS NFR BLD: 0.5 % (ref 0–1.9)
BILIRUB SERPL-MCNC: 0.4 MG/DL (ref 0.1–1)
BUN SERPL-MCNC: 13 MG/DL (ref 8–23)
CALCIUM SERPL-MCNC: 9.1 MG/DL (ref 8.7–10.5)
CHLORIDE SERPL-SCNC: 100 MMOL/L (ref 95–110)
CO2 SERPL-SCNC: 31 MMOL/L (ref 23–29)
CORTIS SERPL-MCNC: 4.2 UG/DL
CREAT SERPL-MCNC: 0.8 MG/DL (ref 0.5–1.4)
DIFFERENTIAL METHOD: ABNORMAL
EOSINOPHIL # BLD AUTO: 0.1 K/UL (ref 0–0.5)
EOSINOPHIL NFR BLD: 2 % (ref 0–8)
ERYTHROCYTE [DISTWIDTH] IN BLOOD BY AUTOMATED COUNT: 13.6 % (ref 11.5–14.5)
EST. GFR  (AFRICAN AMERICAN): >60 ML/MIN/1.73 M^2
EST. GFR  (NON AFRICAN AMERICAN): >60 ML/MIN/1.73 M^2
GLUCOSE SERPL-MCNC: 116 MG/DL (ref 70–110)
HCT VFR BLD AUTO: 38.9 % (ref 37–48.5)
HGB BLD-MCNC: 12.3 G/DL (ref 12–16)
IMM GRANULOCYTES # BLD AUTO: 0.02 K/UL (ref 0–0.04)
IMM GRANULOCYTES NFR BLD AUTO: 0.4 % (ref 0–0.5)
LEFT RENAL DIST DIAS: 22 CM/S
LEFT RENAL DIST SYS: 125 CM/S
LEFT RENAL MID DIAS: 31 CM/S
LEFT RENAL MID RAR: 1.42
LEFT RENAL MID SYS: 210 CM/S
LEFT RENAL ORIGIN DIAS: 20 CM/S
LEFT RENAL ORIGIN SYS: 171 CM/S
LEFT RENAL PROX DIAS: 30 CM/S
LEFT RENAL PROX SYS: 200 CM/S
LEFT RENAL ULTRASOUND ACCELERATION TIME MEASUREMENT 1: 63 MS
LEFT RENAL ULTRASOUND ACCELERATION TIME MEASUREMENT 2: 91 MS
LEFT RENAL ULTRASOUND ACCELERATION TIME MEASUREMENT 3: 51 MS
LEFT RENAL ULTRASOUND ACCELERATION TIME MEASUREMENT AVERAGE: 91 MS
LEFT RENAL ULTRASOUND KIDNEY SIZE MEASUREMENT 1: 10.6 CM
LEFT RENAL ULTRASOUND KIDNEY SIZE MEASUREMENT 2: 10.7 CM
LEFT RENAL ULTRASOUND KIDNEY SIZE MEASUREMENT 3: 10.6 CM
LEFT RENAL ULTRASOUND KIDNEY SIZE MEASUREMENT AVERAGE: 10.7 CM
LEFT RENAL ULTRASOUND RESISTIVE INDEX MEASUREMENT 1: 0.82
LEFT RENAL ULTRASOUND RESISTIVE INDEX MEASUREMENT 2: 0.78
LEFT RENAL ULTRASOUND RESISTIVE INDEX MEASUREMENT 3: 0.77
LEFT RENAL ULTRASOUND RESISTIVE INDEX MEASUREMENT AVERAGE: 0.82
LYMPHOCYTES # BLD AUTO: 2.1 K/UL (ref 1–4.8)
LYMPHOCYTES NFR BLD: 38.4 % (ref 18–48)
MCH RBC QN AUTO: 28.9 PG (ref 27–31)
MCHC RBC AUTO-ENTMCNC: 31.6 G/DL (ref 32–36)
MCV RBC AUTO: 92 FL (ref 82–98)
MONOCYTES # BLD AUTO: 0.4 K/UL (ref 0.3–1)
MONOCYTES NFR BLD: 7.1 % (ref 4–15)
NEUTROPHILS # BLD AUTO: 2.9 K/UL (ref 1.8–7.7)
NEUTROPHILS NFR BLD: 51.6 % (ref 38–73)
NRBC BLD-RTO: 0 /100 WBC
OHS CV LEFT RENAL RAR: 1.42
OHS CV RIGHT RENAL RAR: 1.65
OHS CV US LEFT RENAL HIGHEST EDV: 31
OHS CV US LEFT RENAL HIGHEST PSV: 210
OHS CV US RIGHT RENAL HIGHEST EDV: 35
OHS CV US RIGHT RENAL HIGHEST PSV: 244
PLATELET # BLD AUTO: 196 K/UL (ref 150–350)
PMV BLD AUTO: 9.6 FL (ref 9.2–12.9)
POCT GLUCOSE: 129 MG/DL (ref 70–110)
POCT GLUCOSE: 129 MG/DL (ref 70–110)
POTASSIUM SERPL-SCNC: 3.4 MMOL/L (ref 3.5–5.1)
PROT SERPL-MCNC: 6.2 G/DL (ref 6–8.4)
RBC # BLD AUTO: 4.25 M/UL (ref 4–5.4)
RIGHT RENAL DIST DIAS: 12 CM/S
RIGHT RENAL DIST SYS: 53 CM/S
RIGHT RENAL MID DIAS: 35 CM/S
RIGHT RENAL MID RAR: 1.45
RIGHT RENAL MID SYS: 214 CM/S
RIGHT RENAL ORIGIN DIAS: 31 CM/S
RIGHT RENAL ORIGIN SYS: 219 CM/S
RIGHT RENAL PROX DIAS: 24 CM/S
RIGHT RENAL PROX SYS: 244 CM/S
RIGHT RENAL ULTRASOUND ACCELERATION TIME MEASUREMENT 1: 49 MS
RIGHT RENAL ULTRASOUND ACCELERATION TIME MEASUREMENT 2: 86 MS
RIGHT RENAL ULTRASOUND ACCELERATION TIME MEASUREMENT 3: 97 MS
RIGHT RENAL ULTRASOUND ACCELERATION TIME MEASUREMENT AVERAGE: 97 MS
RIGHT RENAL ULTRASOUND KIDNEY SIZE MEASUREMENT 1: 11.1 CM
RIGHT RENAL ULTRASOUND KIDNEY SIZE MEASUREMENT 2: 11.4 CM
RIGHT RENAL ULTRASOUND KIDNEY SIZE MEASUREMENT 3: 11.2 CM
RIGHT RENAL ULTRASOUND KIDNEY SIZE MEASUREMENT AVERAGE: 11.4 CM
RIGHT RENAL ULTRASOUND RESISTIVE INDEX MEASUREMENT 1: 0.77
RIGHT RENAL ULTRASOUND RESISTIVE INDEX MEASUREMENT 2: 0.8
RIGHT RENAL ULTRASOUND RESISTIVE INDEX MEASUREMENT 3: 0.78
RIGHT RENAL ULTRASOUND RESISTIVE INDEX MEASUREMENT AVERAGE: 0.8
SODIUM SERPL-SCNC: 140 MMOL/L (ref 136–145)
WBC # BLD AUTO: 5.52 K/UL (ref 3.9–12.7)

## 2019-04-25 PROCEDURE — 99239 PR HOSPITAL DISCHARGE DAY,>30 MIN: ICD-10-PCS | Mod: ,,, | Performed by: HOSPITALIST

## 2019-04-25 PROCEDURE — 82533 TOTAL CORTISOL: CPT

## 2019-04-25 PROCEDURE — 25000003 PHARM REV CODE 250: Performed by: PHYSICIAN ASSISTANT

## 2019-04-25 PROCEDURE — 25000003 PHARM REV CODE 250: Performed by: HOSPITALIST

## 2019-04-25 PROCEDURE — 99239 HOSP IP/OBS DSCHRG MGMT >30: CPT | Mod: ,,, | Performed by: HOSPITALIST

## 2019-04-25 PROCEDURE — 93975 CV US RENAL ARTERY STENOSIS HYPERTENSION COMPLETE (CUPID ONLY): ICD-10-PCS | Mod: 26,,, | Performed by: INTERNAL MEDICINE

## 2019-04-25 PROCEDURE — 36415 COLL VENOUS BLD VENIPUNCTURE: CPT

## 2019-04-25 PROCEDURE — 93975 VASCULAR STUDY: CPT | Mod: 50

## 2019-04-25 PROCEDURE — 93975 VASCULAR STUDY: CPT | Mod: 26,,, | Performed by: INTERNAL MEDICINE

## 2019-04-25 PROCEDURE — 25000242 PHARM REV CODE 250 ALT 637 W/ HCPCS: Performed by: HOSPITALIST

## 2019-04-25 PROCEDURE — 80053 COMPREHEN METABOLIC PANEL: CPT

## 2019-04-25 PROCEDURE — 85025 COMPLETE CBC W/AUTO DIFF WBC: CPT

## 2019-04-25 RX ORDER — AMLODIPINE BESYLATE 10 MG/1
10 TABLET ORAL DAILY
Qty: 30 TABLET | Refills: 11 | Status: SHIPPED | OUTPATIENT
Start: 2019-04-26 | End: 2020-04-25

## 2019-04-25 RX ORDER — HYDROCHLOROTHIAZIDE 12.5 MG/1
12.5 TABLET ORAL ONCE
Status: DISCONTINUED | OUTPATIENT
Start: 2019-04-25 | End: 2019-04-25

## 2019-04-25 RX ORDER — ONDANSETRON 8 MG/1
8 TABLET, ORALLY DISINTEGRATING ORAL EVERY 8 HOURS PRN
Qty: 30 TABLET | Refills: 0 | Status: SHIPPED | OUTPATIENT
Start: 2019-04-25 | End: 2019-04-25

## 2019-04-25 RX ORDER — AMLODIPINE BESYLATE 5 MG/1
5 TABLET ORAL ONCE
Status: COMPLETED | OUTPATIENT
Start: 2019-04-25 | End: 2019-04-25

## 2019-04-25 RX ORDER — CIPROFLOXACIN 500 MG/1
500 TABLET ORAL EVERY 12 HOURS
Qty: 18 TABLET | Refills: 0 | Status: SHIPPED | OUTPATIENT
Start: 2019-04-25 | End: 2019-04-25

## 2019-04-25 RX ORDER — HYDROCHLOROTHIAZIDE 12.5 MG/1
12.5 TABLET ORAL DAILY
Qty: 30 TABLET | Refills: 11 | Status: SHIPPED | OUTPATIENT
Start: 2019-04-26 | End: 2020-04-25

## 2019-04-25 RX ORDER — HYDROCHLOROTHIAZIDE 12.5 MG/1
12.5 TABLET ORAL DAILY
Status: DISCONTINUED | OUTPATIENT
Start: 2019-04-26 | End: 2019-04-25 | Stop reason: HOSPADM

## 2019-04-25 RX ORDER — AMOXICILLIN 250 MG
1 CAPSULE ORAL 2 TIMES DAILY
COMMUNITY
Start: 2019-04-25 | End: 2019-04-25

## 2019-04-25 RX ORDER — HYDROCHLOROTHIAZIDE 25 MG/1
25 TABLET ORAL DAILY
Status: DISCONTINUED | OUTPATIENT
Start: 2019-04-26 | End: 2019-04-25

## 2019-04-25 RX ORDER — METRONIDAZOLE 500 MG/1
500 TABLET ORAL EVERY 8 HOURS
Qty: 27 TABLET | Refills: 0 | Status: SHIPPED | OUTPATIENT
Start: 2019-04-25 | End: 2019-05-04

## 2019-04-25 RX ORDER — BENAZEPRIL HYDROCHLORIDE 40 MG/1
40 TABLET ORAL DAILY
Qty: 30 TABLET | Refills: 0 | Status: SHIPPED | OUTPATIENT
Start: 2019-04-25

## 2019-04-25 RX ORDER — FLUTICASONE PROPIONATE 50 MCG
2 SPRAY, SUSPENSION (ML) NASAL DAILY
Status: DISCONTINUED | OUTPATIENT
Start: 2019-04-25 | End: 2019-04-25 | Stop reason: HOSPADM

## 2019-04-25 RX ORDER — BENAZEPRIL HYDROCHLORIDE 40 MG/1
40 TABLET ORAL DAILY
Qty: 30 TABLET | Refills: 0 | Status: SHIPPED | OUTPATIENT
Start: 2019-04-25 | End: 2019-04-25 | Stop reason: SDUPTHER

## 2019-04-25 RX ORDER — ONDANSETRON 8 MG/1
8 TABLET, ORALLY DISINTEGRATING ORAL EVERY 8 HOURS PRN
Qty: 30 TABLET | Refills: 0 | Status: SHIPPED | OUTPATIENT
Start: 2019-04-25

## 2019-04-25 RX ORDER — AMOXICILLIN 250 MG
1 CAPSULE ORAL 2 TIMES DAILY
COMMUNITY
Start: 2019-04-25

## 2019-04-25 RX ORDER — HYDROCHLOROTHIAZIDE 12.5 MG/1
12.5 TABLET ORAL DAILY
Qty: 30 TABLET | Refills: 11 | Status: SHIPPED | OUTPATIENT
Start: 2019-04-26 | End: 2019-04-25

## 2019-04-25 RX ORDER — METRONIDAZOLE 500 MG/1
500 TABLET ORAL EVERY 8 HOURS
Qty: 27 TABLET | Refills: 0 | Status: SHIPPED | OUTPATIENT
Start: 2019-04-25 | End: 2019-04-25

## 2019-04-25 RX ORDER — AMLODIPINE BESYLATE 10 MG/1
10 TABLET ORAL DAILY
Qty: 30 TABLET | Refills: 11 | Status: SHIPPED | OUTPATIENT
Start: 2019-04-26 | End: 2019-04-25

## 2019-04-25 RX ORDER — CIPROFLOXACIN 500 MG/1
500 TABLET ORAL EVERY 12 HOURS
Qty: 18 TABLET | Refills: 0 | Status: SHIPPED | OUTPATIENT
Start: 2019-04-25 | End: 2019-05-04

## 2019-04-25 RX ORDER — AMLODIPINE BESYLATE 10 MG/1
10 TABLET ORAL DAILY
Status: DISCONTINUED | OUTPATIENT
Start: 2019-04-26 | End: 2019-04-25 | Stop reason: HOSPADM

## 2019-04-25 RX ADMIN — FLUTICASONE PROPIONATE 100 MCG: 50 SPRAY, METERED NASAL at 11:04

## 2019-04-25 RX ADMIN — BENAZEPRIL HYDROCHLORIDE 40 MG: 40 TABLET, COATED ORAL at 07:04

## 2019-04-25 RX ADMIN — CIPROFLOXACIN HYDROCHLORIDE 500 MG: 500 TABLET, FILM COATED ORAL at 07:04

## 2019-04-25 RX ADMIN — METRONIDAZOLE 500 MG: 500 TABLET ORAL at 07:04

## 2019-04-25 RX ADMIN — AMLODIPINE BESYLATE 5 MG: 5 TABLET ORAL at 11:04

## 2019-04-25 RX ADMIN — AMLODIPINE BESYLATE 5 MG: 5 TABLET ORAL at 07:04

## 2019-04-25 RX ADMIN — STANDARDIZED SENNA CONCENTRATE AND DOCUSATE SODIUM 1 TABLET: 8.6; 5 TABLET, FILM COATED ORAL at 07:04

## 2019-04-25 RX ADMIN — CITALOPRAM HYDROBROMIDE 40 MG: 20 TABLET ORAL at 07:04

## 2019-04-25 RX ADMIN — HYDROCHLOROTHIAZIDE 12.5 MG: 12.5 TABLET ORAL at 07:04

## 2019-04-25 RX ADMIN — GABAPENTIN 400 MG: 400 CAPSULE ORAL at 07:04

## 2019-04-25 RX ADMIN — ALPRAZOLAM 0.12 MG: 0.25 TABLET ORAL at 07:04

## 2019-04-25 RX ADMIN — PANTOPRAZOLE SODIUM 40 MG: 40 TABLET, DELAYED RELEASE ORAL at 07:04

## 2019-04-25 NOTE — PLAN OF CARE
No discharge needs identified. Discharge and follow-up instructions to be completed by the bedside nurse.     04/25/19 9418   Final Note   Assessment Type Final Discharge Note   Anticipated Discharge Disposition Home   What phone number can be called within the next 1-3 days to see how you are doing after discharge?   ([phone])   Hospital Follow Up  Appt(s) scheduled? Yes   Discharge plans and expectations educations in teach back method with documentation complete? Yes  (per bedside nurse)

## 2019-04-25 NOTE — DISCHARGE SUMMARY
"  Discharge Summary  Hospital Medicine       Name: Anjana Reynolds  YOB: 1940 (Age: 78 y.o.)  Date of Admission: 4/23/2019  Date of Discharge: 4/25/2019  Attending Provider on Discharge: Jacquelyn Martinez MD  [facility] Team: [facility]  Code status: Full Code    Primary Care Provider: Fernie Houser RN    Discharge Diagnosis:  Active Hospital Problems    Diagnosis  POA    *Hypertensive emergency [I16.1]  Yes    Enteritis [K52.9]  Yes    Headache [R51]  Unknown    Non-intractable vomiting with nausea [R11.2]  Yes    Type 2 diabetes mellitus with diabetic polyneuropathy, without long-term current use of insulin [E11.42]  Yes     Chronic    HLD (hyperlipidemia) [E78.5]  Yes     Chronic      Resolved Hospital Problems   No resolved problems to display.       Per HPI: Patient is a 78 year old lady with a h/o HTN, DM II not on insulin, HLD.  She presents with elevated BP.  No family at bedside.  Patient began having "gray spots" in her vision starting around 16:30.  She also noticed intermittent slurred speech and right-sided temporal headache.  Symptoms lasted about 45 minutes.  She took her BP at the time and noted that it was 214/114.  She states she has had dizziness and difficulty ambulating for the past few weeks.  She also reports noticing a "droop" to the left side of her face.  Denies unilateral weakness, numbness, paresthesias.  She does note two episodes of emesis today and chronic right sided abdominal pain.  Denies chest pain, SOB, palpitations, fever/chills.  Patient is noted to be somnolent on exam, but easily aroused.  She lives in [place] and is visiting for [facility].    Hospital Course:  Hypertensive emergency  -CT head, MRI/MRA brain and neck with no evidence of acute abnormalities. Results discussed by ED provider with vascular neurology, who felt TIA, CVA unlikely.  -UA unremarkable.  -BP in ED as high as 220/98.  Improved to 172/86 with IV hydralazine 10mg in the ED but next " morning increased again to 211/100  -Patient states her BP meds were changed December and HCTZ was discontinued.  -Continue home benazepril 40mg daily.  -restart PTA HCTZ 12.5mg BID  -start amlodipine 10mg daily  -check TSH, AM cortisol wnl  -renal arterial duplex done  -troponin wnl      Non-intractable Nausea with Vomiting:  Enteritis:  -Patient notes h/o chronic right-sided abdominal pain. With nausea today and had 7 episodes of vomiting prior to admission  -WBC wnl  -CT abdomen shows Mild distention and wall thickening of jejunal loops in the left upper quadrant without transition point to suggest definite obstruction.  Possible enteritis.   -zofran available PRN  -abx with cipro/flagyl to finish 10 day course     HLD (hyperlipidemia)  -Continue simvastatin 40mg qHS.     Type 2 diabetes mellitus with diabetic polyneuropathy, without long-term current use of insulin  -Last HbA1c: 6.2 on 4/24/19  -Continue gabapentin 400mg daily  -BG goal: Preprandial blood glucose target <140 mg/dL, Random glucoses <180 mg/dL  -ADA diet     Anxiety:  -continue PTA citalopram       Labs:  Recent Labs   Lab 04/23/19 2158 04/24/19  0548 04/25/19  0343   WBC 5.89 6.08 5.52   HGB 12.6 12.7 12.3   HCT 38.7 39.2 38.9    198 196     Recent Labs   Lab 04/23/19 2158 04/24/19  0548 04/24/19  1251 04/25/19  0343    140  --  140   K 3.4* 3.7  --  3.4*    104  --  100   CO2 26 26  --  31*   BUN 12 10  --  13   CREATININE 0.8 0.7  --  0.8   * 107  --  116*   CALCIUM 8.7 9.1  --  9.1   MG 1.8 2.1  --   --    PHOS  --  4.3  --   --    LIPASE  --   --  15  --      Recent Labs   Lab 04/23/19 2158 04/24/19  0548 04/25/19  0343   ALKPHOS 99 99 94   ALT 9* 10 10   AST 16 18 12   ALBUMIN 3.5 3.5 3.3*   PROT 6.5 6.5 6.2   BILITOT 0.4 0.5 0.4   INR 1.0  --   --       Recent Labs   Lab 04/24/19  0925 04/24/19  1117 04/24/19  1616 04/24/19  2055 04/25/19  0701   POCTGLUCOSE 117* 159* 126* 129* 129*     Recent Labs      04/23/19  2158 04/24/19  1251   TROPONINI <0.006 <0.006       ROS (Positive in Bold, otherwise negative)  Constitutional: fever, chills, night sweats  CV: chest pain, edema, palpitations  Resp: SOB, cough, sputum production  GI: changes in appetite, NVDC, pain, melena, hematochezia, GERD, hematemesis  : Dysuria, hematuria, urinary urgency, frequency  MSK: arthralgia/myalgia, joint swelling  Neuro/Psych: anxiety, depression    PEx   Temp:  [97.6 °F (36.4 °C)-98.5 °F (36.9 °C)]   Pulse:  [64-85]   Resp:  [17-18]   BP: (137-198)/(63-89)   SpO2:  [91 %-95 %]      General: no distress   Lungs: clear to ausculation anteriorly and posteriorly   Heart: regular rate and rhythm   Abdomen: normal bowel sounds, soft, no tenderness   Extremities: no edema. No clubbing or cyanosis     Procedures:   CT abdo pelvis:  Mild distention and wall thickening of jejunal loops in the left upper quadrant without transition point to suggest definite obstruction.  Possible enteritis.    Moderate hiatal hernia.    Scattered diverticulosis coli without evidence of diverticulitis.    Prominent aortic calcification.    Nonspecific hypoattenuating lesion in the right hepatic lobe that is too small to definitively characterize.  If there is further clinical concern or patient has risk factors for may primary or secondary hepatic neoplasm, a nonemergent CT/MRI triple phase can be performed for further evaluation      Consultants: neurology     Current Discharge Medication List      START taking these medications    Details   amLODIPine (NORVASC) 10 MG tablet Take 1 tablet (10 mg total) by mouth once daily.  Qty: 30 tablet, Refills: 11      ciprofloxacin HCl (CIPRO) 500 MG tablet Take 1 tablet (500 mg total) by mouth every 12 (twelve) hours. for 9 days  Qty: 18 tablet, Refills: 0      hydroCHLOROthiazide (HYDRODIURIL) 12.5 MG Tab Take 1 tablet (12.5 mg total) by mouth once daily.  Qty: 30 tablet, Refills: 11      metroNIDAZOLE (FLAGYL) 500 MG  tablet Take 1 tablet (500 mg total) by mouth every 8 (eight) hours. for 9 days  Qty: 27 tablet, Refills: 0      ondansetron (ZOFRAN-ODT) 8 MG TbDL Take 1 tablet (8 mg total) by mouth every 8 (eight) hours as needed.  Qty: 30 tablet, Refills: 0      senna-docusate 8.6-50 mg (PERICOLACE) 8.6-50 mg per tablet Take 1 tablet by mouth 2 (two) times daily.         CONTINUE these medications which have CHANGED    Details   benazepril (LOTENSIN) 40 MG tablet Take 1 tablet (40 mg total) by mouth once daily.  Qty: 30 tablet, Refills: 0         CONTINUE these medications which have NOT CHANGED    Details   citalopram (CELEXA) 40 MG tablet Take 40 mg by mouth once daily.      donepezil (ARICEPT) 10 MG tablet Take 10 mg by mouth every evening.      gabapentin (NEURONTIN) 400 MG capsule Take 400 mg by mouth once daily.       omeprazole (PRILOSEC) 40 MG capsule Take 40 mg by mouth once daily.      simvastatin (ZOCOR) 40 MG tablet Take 40 mg by mouth every evening.         STOP taking these medications       ALPRAZolam (XANAX) 0.25 MG tablet Comments:   Reason for Stopping:         furosemide (LASIX) 40 MG tablet Comments:   Reason for Stopping:               The relevant and important risks, side effects, and benefits of their medications were reviewed with patient during hospitalization and at discharge. The patient was given the opportunity to discuss and ask questions about their medications, including target symptoms, potential risks, side effects and benefits of their medications, as well as their expected prognosis if non-medication treatment options were chosen.  The patient expresses understanding of all these options and information and voluntarily consents to treatment.    Discharge Diet:cardiac diet and diabetic diet: 1800 calorie with Normal Fluid intake of 1500 - 2000 mL per day    Activity: activity as tolerated    Discharge Condition: Stable    Disposition: Home or Self Care    Tests pending at the time of discharge:  none      Time spent  on the discharge of the patient including review of hospital course with the patient. reviewing discharge medications and arranging follow-up care: 37 mins    Discharge examination Patient was seen and examined on the date of discharge and determined to be suitable for discharge.    Discharge plan and follow up:  It is critical that you make your follow-up appointment(s). If you are discharged on the weekend or after business hours, or if we are unable to schedule these appointments for you for any reason, you or a family member need to call during the next business day to schedule your appointment(s).    -Follow up with you PCP, Fernie Houser RN within 1-2 weeks as arranged with SW and treatment team prior to discharge  -Take all medications as prescribed and listed above.    - Please return to ED or call your physician if you have:         1. Fevers > 101.5 unresponsive to tylenol.       2. Abdominal pain and/or distention       3. Intractable nausea, vomiting or diarrhea       4. Inability to tolerate adequate oral intake of food       5. Neurologic changes, chest pain or shortness of breath         F/u with PCP in 1 week    Jacquelyn Martinez MD  Hospital Medicine Staff  [phone] pager

## 2019-04-25 NOTE — PLAN OF CARE
Problem: Adult Inpatient Plan of Care  Goal: Plan of Care Review  Outcome: Ongoing (interventions implemented as appropriate)  PT resting quietly in bed. Medication given earlier for headache. PT educated on fall safety and call light usage, Rounds made q2h and PRN. Call light in reach, bed locked and in lowest position, will continue to monitor.

## 2019-04-25 NOTE — PLAN OF CARE
04/25/19 0845   Discharge Assessment   Assessment Type Discharge Planning Assessment   Confirmed/corrected address and phone number on facesheet? Yes   Assessment information obtained from? Patient;Medical Record   Expected Length of Stay (days) 2   Communicated expected length of stay with patient/caregiver yes   Prior to hospitilization cognitive status: Alert/Oriented   Prior to hospitalization functional status: Independent   Current cognitive status: Alert/Oriented   Current Functional Status: Independent   Lives With spouse   Able to Return to Prior Arrangements yes   Is patient able to care for self after discharge? Yes   Who are your caregiver(s) and their phone number(s)? scarlett Alcaraz- [phone]   Patient's perception of discharge disposition home or selfcare   Readmission Within the Last 30 Days no previous admission in last 30 days   Patient currently being followed by outpatient case management? No   Patient currently receives any other outside agency services? No   Equipment Currently Used at Home none   Do you have any problems affording any of your prescribed medications? No   Is the patient taking medications as prescribed? yes   Does the patient have transportation home? Yes   Transportation Anticipated family or friend will provide   Does the patient receive services at the [facility]? No   Discharge Plan A Home with family   Discharge Plan B Home Health;Home with family   DME Needed Upon Discharge  none   Patient/Family in Agreement with Plan yes     PCP: Fernie Houser RN    Pharmacy:   [facility] [address] [place] [address] [address] [facility]  [address]  [facility] [address]  Phone: [phone] Fax: [phone]    Payor: MEDICARE / Plan: MEDICARE PART A & B / Product Type: Government /     Patient denies any discharge needs. No discharge needs identified at this time. CM completed discharge assessment and planning with patient.  Patient verbalized understanding. All questions and concerns addressed. SW and CM will continue to follow for any additional needs. Plan A to discharge home with family support as soon as medically stable. Plan B to discharge home with family support and home health.    Nicky Clinton RN, BSN, CM  [facility]  Nurse - Med Onc/Gyn Onc

## 2022-01-18 NOTE — ED TRIAGE NOTES
"Pt presents from home with dizziness, headache, "spots in her eyes" and hypertension that began earlier today. Pt has hx of HTN and states she took her medications later today than normal and took her BP at home and it was in the 200s, she states "I thought I would feel better eventually." Pt denies any chest pain or any other symptoms at this time.  " Repeat lactate was drawn.

## 2023-06-01 ENCOUNTER — INPATIENT HOSPITAL (OUTPATIENT)
Dept: URBAN - METROPOLITAN AREA HOSPITAL 95 | Facility: HOSPITAL | Age: 83
End: 2023-06-01

## 2023-06-01 DIAGNOSIS — D62 ACUTE POSTHEMORRHAGIC ANEMIA: ICD-10-CM

## 2023-06-01 DIAGNOSIS — E87.5 HYPERKALEMIA: ICD-10-CM

## 2023-06-01 DIAGNOSIS — K92.2 GASTROINTESTINAL HEMORRHAGE, UNSPECIFIED: ICD-10-CM

## 2023-06-01 DIAGNOSIS — N17.9 ACUTE KIDNEY FAILURE, UNSPECIFIED: ICD-10-CM

## 2023-06-01 DIAGNOSIS — F03.90 UNSPECIFIED DEMENTIA, UNSPECIFIED SEVERITY, WITHOUT BEHAVIOR: ICD-10-CM

## 2023-06-01 PROCEDURE — 99222 1ST HOSP IP/OBS MODERATE 55: CPT | Performed by: INTERNAL MEDICINE

## 2023-06-02 ENCOUNTER — INPATIENT HOSPITAL (OUTPATIENT)
Dept: URBAN - METROPOLITAN AREA HOSPITAL 95 | Facility: HOSPITAL | Age: 83
End: 2023-06-02

## 2023-06-02 DIAGNOSIS — K92.2 GASTROINTESTINAL HEMORRHAGE, UNSPECIFIED: ICD-10-CM

## 2023-06-02 DIAGNOSIS — K21.00 GASTRO-ESOPHAGEAL REFLUX DISEASE WITH ESOPHAGITIS, WITHOUT B: ICD-10-CM

## 2023-06-02 DIAGNOSIS — K44.9 DIAPHRAGMATIC HERNIA WITHOUT OBSTRUCTION OR GANGRENE: ICD-10-CM

## 2023-06-02 DIAGNOSIS — K92.1 MELENA: ICD-10-CM

## 2023-06-02 PROCEDURE — 43235 EGD DIAGNOSTIC BRUSH WASH: CPT | Performed by: INTERNAL MEDICINE

## 2023-06-05 ENCOUNTER — INPATIENT HOSPITAL (OUTPATIENT)
Dept: URBAN - METROPOLITAN AREA HOSPITAL 95 | Facility: HOSPITAL | Age: 83
End: 2023-06-05

## 2023-06-05 DIAGNOSIS — K20.90 ESOPHAGITIS, UNSPECIFIED WITHOUT BLEEDING: ICD-10-CM

## 2023-06-05 DIAGNOSIS — K44.9 DIAPHRAGMATIC HERNIA WITHOUT OBSTRUCTION OR GANGRENE: ICD-10-CM

## 2023-06-05 PROCEDURE — 99232 SBSQ HOSP IP/OBS MODERATE 35: CPT | Performed by: INTERNAL MEDICINE

## 2025-05-15 NOTE — ED NOTES
LOC: Patient name and date of birth verified for Anjana Reynolds. The patient is awake, alert and aware of environment with an appropriate affect, the patient is oriented x 3 and speaking appropriately.   APPEARANCE: Patient resting comfortably, patient is clean and well groomed, patient's clothing is properly fastened.  SKIN: The skin is warm and dry, color consistent with ethnicity, patient has normal skin turgor and moist mucus membranes, skin intact, no breakdown or bruising noted.  MUSCULOSKELETAL: Patient moving all extremities well, no obvious swelling or deformities noted.   RESPIRATORY: Respirations are spontaneous, patient has a normal effort and rate, no accessory muscle use noted.  CARDIAC: Patient has a normal rate, no periphreal edema noted, capillary refill < 3 seconds.  ABDOMEN: Soft and non tender, no distention noted.   NEUROLOGIC: Eyes open spontaneously, behavior appropriate to situation, follows commands, facial expression symmetrical.   LE edema